# Patient Record
Sex: FEMALE | Race: OTHER | HISPANIC OR LATINO
[De-identification: names, ages, dates, MRNs, and addresses within clinical notes are randomized per-mention and may not be internally consistent; named-entity substitution may affect disease eponyms.]

---

## 2017-09-12 PROBLEM — Z00.00 ENCOUNTER FOR PREVENTIVE HEALTH EXAMINATION: Noted: 2017-09-12

## 2017-10-04 ENCOUNTER — OTHER (OUTPATIENT)
Age: 62
End: 2017-10-04

## 2017-10-04 DIAGNOSIS — M25.569 PAIN IN UNSPECIFIED KNEE: ICD-10-CM

## 2017-10-12 ENCOUNTER — APPOINTMENT (OUTPATIENT)
Dept: ORTHOPEDIC SURGERY | Facility: CLINIC | Age: 62
End: 2017-10-12
Payer: MEDICAID

## 2017-10-12 VITALS
BODY MASS INDEX: 35.34 KG/M2 | HEIGHT: 60 IN | HEART RATE: 73 BPM | DIASTOLIC BLOOD PRESSURE: 82 MMHG | WEIGHT: 180 LBS | SYSTOLIC BLOOD PRESSURE: 133 MMHG

## 2017-10-12 DIAGNOSIS — Z78.9 OTHER SPECIFIED HEALTH STATUS: ICD-10-CM

## 2017-10-12 PROCEDURE — 20610 DRAIN/INJ JOINT/BURSA W/O US: CPT | Mod: RT

## 2017-10-12 PROCEDURE — 99203 OFFICE O/P NEW LOW 30 MIN: CPT | Mod: 25

## 2017-10-12 PROCEDURE — 73564 X-RAY EXAM KNEE 4 OR MORE: CPT | Mod: RT

## 2019-09-15 ENCOUNTER — EMERGENCY (EMERGENCY)
Facility: HOSPITAL | Age: 64
LOS: 1 days | Discharge: HOME | End: 2019-09-15
Attending: EMERGENCY MEDICINE
Payer: COMMERCIAL

## 2019-09-15 VITALS
RESPIRATION RATE: 18 BRPM | OXYGEN SATURATION: 100 % | WEIGHT: 179.9 LBS | SYSTOLIC BLOOD PRESSURE: 163 MMHG | HEART RATE: 69 BPM | DIASTOLIC BLOOD PRESSURE: 79 MMHG | TEMPERATURE: 99 F

## 2019-09-15 VITALS
DIASTOLIC BLOOD PRESSURE: 72 MMHG | RESPIRATION RATE: 16 BRPM | TEMPERATURE: 98 F | OXYGEN SATURATION: 99 % | SYSTOLIC BLOOD PRESSURE: 137 MMHG | HEART RATE: 65 BPM

## 2019-09-15 LAB
ALBUMIN SERPL ELPH-MCNC: 4.6 G/DL — SIGNIFICANT CHANGE UP (ref 3.3–5.2)
ALP SERPL-CCNC: 95 U/L — SIGNIFICANT CHANGE UP (ref 40–120)
ALT FLD-CCNC: 14 U/L — SIGNIFICANT CHANGE UP
ANION GAP SERPL CALC-SCNC: 13 MMOL/L — SIGNIFICANT CHANGE UP (ref 5–17)
AST SERPL-CCNC: 16 U/L — SIGNIFICANT CHANGE UP
BILIRUB SERPL-MCNC: 0.3 MG/DL — LOW (ref 0.4–2)
BUN SERPL-MCNC: 8 MG/DL — SIGNIFICANT CHANGE UP (ref 8–20)
CALCIUM SERPL-MCNC: 9.6 MG/DL — SIGNIFICANT CHANGE UP (ref 8.6–10.2)
CHLORIDE SERPL-SCNC: 106 MMOL/L — SIGNIFICANT CHANGE UP (ref 98–107)
CO2 SERPL-SCNC: 22 MMOL/L — SIGNIFICANT CHANGE UP (ref 22–29)
CREAT SERPL-MCNC: 0.5 MG/DL — SIGNIFICANT CHANGE UP (ref 0.5–1.3)
GLUCOSE SERPL-MCNC: 115 MG/DL — SIGNIFICANT CHANGE UP (ref 70–115)
HCT VFR BLD CALC: 40.9 % — SIGNIFICANT CHANGE UP (ref 34.5–45)
HGB BLD-MCNC: 13.5 G/DL — SIGNIFICANT CHANGE UP (ref 11.5–15.5)
MAGNESIUM SERPL-MCNC: 2.3 MG/DL — SIGNIFICANT CHANGE UP (ref 1.6–2.6)
MCHC RBC-ENTMCNC: 28.8 PG — SIGNIFICANT CHANGE UP (ref 27–34)
MCHC RBC-ENTMCNC: 33 GM/DL — SIGNIFICANT CHANGE UP (ref 32–36)
MCV RBC AUTO: 87.4 FL — SIGNIFICANT CHANGE UP (ref 80–100)
NT-PROBNP SERPL-SCNC: 77 PG/ML — SIGNIFICANT CHANGE UP (ref 0–300)
PLATELET # BLD AUTO: 310 K/UL — SIGNIFICANT CHANGE UP (ref 150–400)
POTASSIUM SERPL-MCNC: 3.9 MMOL/L — SIGNIFICANT CHANGE UP (ref 3.5–5.3)
POTASSIUM SERPL-SCNC: 3.9 MMOL/L — SIGNIFICANT CHANGE UP (ref 3.5–5.3)
PROT SERPL-MCNC: 7.6 G/DL — SIGNIFICANT CHANGE UP (ref 6.6–8.7)
RBC # BLD: 4.68 M/UL — SIGNIFICANT CHANGE UP (ref 3.8–5.2)
RBC # FLD: 14 % — SIGNIFICANT CHANGE UP (ref 10.3–14.5)
SODIUM SERPL-SCNC: 141 MMOL/L — SIGNIFICANT CHANGE UP (ref 135–145)
TROPONIN T SERPL-MCNC: <0.01 NG/ML — SIGNIFICANT CHANGE UP (ref 0–0.06)
WBC # BLD: 8.33 K/UL — SIGNIFICANT CHANGE UP (ref 3.8–10.5)
WBC # FLD AUTO: 8.33 K/UL — SIGNIFICANT CHANGE UP (ref 3.8–10.5)

## 2019-09-15 PROCEDURE — T1013: CPT

## 2019-09-15 PROCEDURE — 99284 EMERGENCY DEPT VISIT MOD MDM: CPT | Mod: 25

## 2019-09-15 PROCEDURE — 83735 ASSAY OF MAGNESIUM: CPT

## 2019-09-15 PROCEDURE — 36415 COLL VENOUS BLD VENIPUNCTURE: CPT

## 2019-09-15 PROCEDURE — 71046 X-RAY EXAM CHEST 2 VIEWS: CPT | Mod: 26

## 2019-09-15 PROCEDURE — 93010 ELECTROCARDIOGRAM REPORT: CPT

## 2019-09-15 PROCEDURE — 71046 X-RAY EXAM CHEST 2 VIEWS: CPT

## 2019-09-15 PROCEDURE — 70450 CT HEAD/BRAIN W/O DYE: CPT

## 2019-09-15 PROCEDURE — 85027 COMPLETE CBC AUTOMATED: CPT

## 2019-09-15 PROCEDURE — 96375 TX/PRO/DX INJ NEW DRUG ADDON: CPT

## 2019-09-15 PROCEDURE — 99285 EMERGENCY DEPT VISIT HI MDM: CPT

## 2019-09-15 PROCEDURE — 84484 ASSAY OF TROPONIN QUANT: CPT

## 2019-09-15 PROCEDURE — 70450 CT HEAD/BRAIN W/O DYE: CPT | Mod: 26

## 2019-09-15 PROCEDURE — 96374 THER/PROPH/DIAG INJ IV PUSH: CPT

## 2019-09-15 PROCEDURE — 93005 ELECTROCARDIOGRAM TRACING: CPT

## 2019-09-15 PROCEDURE — 83880 ASSAY OF NATRIURETIC PEPTIDE: CPT

## 2019-09-15 PROCEDURE — 80053 COMPREHEN METABOLIC PANEL: CPT

## 2019-09-15 RX ORDER — KETOROLAC TROMETHAMINE 30 MG/ML
15 SYRINGE (ML) INJECTION ONCE
Refills: 0 | Status: DISCONTINUED | OUTPATIENT
Start: 2019-09-15 | End: 2019-09-15

## 2019-09-15 RX ORDER — METOCLOPRAMIDE HCL 10 MG
10 TABLET ORAL ONCE
Refills: 0 | Status: COMPLETED | OUTPATIENT
Start: 2019-09-15 | End: 2019-09-15

## 2019-09-15 RX ORDER — MECLIZINE HCL 12.5 MG
25 TABLET ORAL ONCE
Refills: 0 | Status: COMPLETED | OUTPATIENT
Start: 2019-09-15 | End: 2019-09-15

## 2019-09-15 RX ORDER — MECLIZINE HCL 12.5 MG
1 TABLET ORAL
Qty: 9 | Refills: 0
Start: 2019-09-15 | End: 2019-09-17

## 2019-09-15 RX ORDER — SODIUM CHLORIDE 9 MG/ML
1000 INJECTION INTRAMUSCULAR; INTRAVENOUS; SUBCUTANEOUS ONCE
Refills: 0 | Status: COMPLETED | OUTPATIENT
Start: 2019-09-15 | End: 2019-09-15

## 2019-09-15 RX ADMIN — Medication 15 MILLIGRAM(S): at 04:54

## 2019-09-15 RX ADMIN — Medication 25 MILLIGRAM(S): at 04:55

## 2019-09-15 RX ADMIN — Medication 10 MILLIGRAM(S): at 04:54

## 2019-09-15 RX ADMIN — SODIUM CHLORIDE 1000 MILLILITER(S): 9 INJECTION INTRAMUSCULAR; INTRAVENOUS; SUBCUTANEOUS at 04:55

## 2019-09-15 NOTE — ED PROVIDER NOTE - NS_ ATTENDINGSCRIBEDETAILS _ED_A_ED_FT
I, Nitza Rodriguez, performed the initial face to face bedside interview with this patient regarding history of present illness, review of symptoms and relevant past medical, social and family history.  I completed an independent physical examination.  The history, relevant review of systems, past medical and surgical history, medical decision making, and physical examination was documented by the scribe in my presence and I attest to the accuracy of the documentation.

## 2019-09-15 NOTE — ED PROVIDER NOTE - CLINICAL SUMMARY MEDICAL DECISION MAKING FREE TEXT BOX
Pt with vertigo, worse with movement since Friday, no focal neuro deficits, not ataxic, c/o HA and 1 ep of vomiting treat as vertigo, CT head and reassess. Pt with vertigo, worse with movement since Friday, no focal neuro deficits, not ataxic, c/o HA and 1 ep of SOB today. Will treat as vertigo, CT head and reassess.

## 2019-09-15 NOTE — ED PROVIDER NOTE - PROGRESS NOTE DETAILS
The patient was re-examined after interventions and is feeling much better.  Able to walk without difficulty, vertigo resolved. w/u negative. The patient will follow up with their primary physician this week.

## 2019-09-15 NOTE — ED PROVIDER NOTE - OBJECTIVE STATEMENT
65 y/o F pt with no significant PMHx presents to the ED c/o constant dizziness that began 2 days ago (Friday). Pt reports associated nausea and headache that began today and an episode of vomiting today. Notes that she is currently dizzy even while laying in stretcher at rest, but is worsened with movement. Also currently have headache. Denies smoking, any known allergies. No further acute complaints at this time. 65 y/o F pt with no significant PMHx presents to the ED c/o constant dizziness that began 2 days ago (Friday). Pt reports associated nausea and headache that began today and an episode of vomiting and SOB today. Notes that she is currently dizzy even while laying in stretcher at rest, but is worsened with movement. Also currently have headache. Denies smoking, any known allergies. No further acute complaints at this time. 63 y/o F pt with no significant PMHx presents to the ED c/o dizziness that began 2 days ago (Friday). Pt reports associated nausea and headache that began today and an episode of vomiting and SOB today. HA mild frontal. also with tinnitus in rt ear. Notes that she is currently dizzy even while laying in stretcher at rest, but is worsened with movement. Also currently have headache. Denies smoking, any known allergies. No further acute complaints at this time.

## 2019-09-15 NOTE — ED PROVIDER NOTE - PLAN OF CARE
1. Return to ED for worsening, progressive or any other concerning symptoms   2. Follow up with your primary care doctor in 2-3days   3. Take motrin 600mg every 6 hours as needed for pain and Take Tylenol up to 650 mg every 6 hours as needed for pain.   4. Take 25mg of meclizine three times a day for 3-5 days.   5. Follow up with neurology Dr Bianchi

## 2019-09-15 NOTE — ED PROVIDER NOTE - NS ED ROS FT
ROS: no CP/SOB. no cough. no fever. no d/c. no abd pain. no rash. no bleeding. no urinary complaints. no weakness. no vision changes. no neck/back pain. no extremity swelling/deformity. No change in mental status.'    (+) dizziness, vomiting, nausea, HA ROS: no CP. no cough. no fever. no d/c. no abd pain. no rash. no bleeding. no urinary complaints. no weakness. no vision changes. no neck/back pain. no extremity swelling/deformity. No change in mental status.'    (+) dizziness, vomiting, nausea, HA, SOB

## 2019-09-15 NOTE — ED ADULT TRIAGE NOTE - CHIEF COMPLAINT QUOTE
patient biba from home with complaints of dizziness, nausea and a headache which has become worse since yesterday, patient states that the dizziness and headache gets worse when she goes to sit up. patient states that tonight she has had difficulty breathing and then the symptoms became worse patient does not appear to be in any apparent distress at this time

## 2019-09-15 NOTE — ED PROVIDER NOTE - CHIEF COMPLAINT
She did not have any specific lab tests to evaluate for cardiac disease prior to her last physical. If she does have a heart murmur, an echocardiogram could be ordered to look at the structure of her heart and further testing would depend on if she has any symptoms of cardiac disease, chest pain, dizziness, shortness of breath.. She does have an elevated total cholesterol and I suggest she discuss potential treatment of this with Dr. Gramajo after she returns.   The patient is a 64y Female complaining of nausea.

## 2019-09-15 NOTE — ED PROVIDER NOTE - PHYSICAL EXAMINATION
Gen: NAD, AOx3  Head: NCAT  HEENT: PERRL, EOMI, oral mucosa moist, normal conjunctiva, neck supple  Lung: CTAB, no respiratory distress  CV: rrr, no murmur, Normal perfusion  Abd: soft, NTND, no CVA tenderness  MSK: No edema, no visible deformities  Neuro: CN II-XII intact, 5/5 global strength, sensation intact, no dysmetria/ataxia, positive L sided fatigue-able nystagmus    Skin: No rash   Psych: normal affect Gen: NAD, AOx3  Head: NCAT  HEENT: PERRL, EOMI, oral mucosa moist, normal conjunctiva, neck supple  Lung: CTAB, no respiratory distress  CV: rrr, no murmur, Normal perfusion  Abd: soft, NTND  MSK: No edema, no visible deformities  Neuro: CN II-XII intact, 5/5 global strength, sensation intact, no dysmetria/ataxia, positive L sided fatigue-able nystagmus    Skin: No rash   Psych: normal affect

## 2019-09-15 NOTE — ED PROVIDER NOTE - CARE PLAN
Principal Discharge DX:	Vertigo  Assessment and plan of treatment:	1. Return to ED for worsening, progressive or any other concerning symptoms   2. Follow up with your primary care doctor in 2-3days   3. Take motrin 600mg every 6 hours as needed for pain and Take Tylenol up to 650 mg every 6 hours as needed for pain.   4. Take 25mg of meclizine three times a day for 3-5 days.   5. Follow up with neurology Dr Bianchi

## 2019-09-15 NOTE — ED PROVIDER NOTE - PATIENT PORTAL LINK FT
You can access the FollowMyHealth Patient Portal offered by [facility] by registering at the following website: [email or web address]. By joining [facility]’s FollowMyHealth portal, you will also be able to view your health information using other applications (apps) compatible with our system.

## 2020-02-29 ENCOUNTER — EMERGENCY (EMERGENCY)
Facility: HOSPITAL | Age: 65
LOS: 1 days | Discharge: HOME | End: 2020-02-29
Attending: EMERGENCY MEDICINE
Payer: SELF-PAY

## 2020-02-29 VITALS
HEART RATE: 70 BPM | HEIGHT: 64 IN | TEMPERATURE: 99 F | OXYGEN SATURATION: 98 % | SYSTOLIC BLOOD PRESSURE: 181 MMHG | WEIGHT: 179.24 LBS | DIASTOLIC BLOOD PRESSURE: 80 MMHG | RESPIRATION RATE: 18 BRPM

## 2020-02-29 PROCEDURE — 96372 THER/PROPH/DIAG INJ SC/IM: CPT

## 2020-02-29 PROCEDURE — 73562 X-RAY EXAM OF KNEE 3: CPT

## 2020-02-29 PROCEDURE — 99284 EMERGENCY DEPT VISIT MOD MDM: CPT

## 2020-02-29 PROCEDURE — 99283 EMERGENCY DEPT VISIT LOW MDM: CPT | Mod: 25

## 2020-02-29 PROCEDURE — 73562 X-RAY EXAM OF KNEE 3: CPT | Mod: 26,50

## 2020-02-29 PROCEDURE — T1013: CPT

## 2020-02-29 RX ORDER — KETOROLAC TROMETHAMINE 30 MG/ML
30 SYRINGE (ML) INJECTION ONCE
Refills: 0 | Status: DISCONTINUED | OUTPATIENT
Start: 2020-02-29 | End: 2020-02-29

## 2020-02-29 RX ADMIN — Medication 30 MILLIGRAM(S): at 14:52

## 2020-02-29 NOTE — ED PROVIDER NOTE - ATTENDING CONTRIBUTION TO CARE
63 y/o knee pain s/p fall slipped on fruit skin  swelling to right knee   tenderness    xray    analgesia

## 2020-02-29 NOTE — ED PROVIDER NOTE - CLINICAL SUMMARY MEDICAL DECISION MAKING FREE TEXT BOX
64 yr old F presented to ED for knee pain s/p fall. Pt explained that she was shopping when she slipped on a fruit skin. Pt states that she grabbed on the shopping cart so she did not fall to the floor. Examination + small swelling to right knee. Slight tenderness to right shoulder and left knee. Pt treated with pain medication and X-ray + degenerative knee disease. Pt D/c with Rx sent to her Pharmacy.

## 2020-02-29 NOTE — ED ADULT TRIAGE NOTE - CHIEF COMPLAINT QUOTE
pt arrive by ambulance with c/o bilat knee pain R>L s/p [place] on a banana peel in the super market

## 2020-02-29 NOTE — ED PROVIDER NOTE - PHYSICAL EXAMINATION
Knee examination : No soft tissue swelling noted. Right /Left Knee No Ballottement signs noted. No deformity noted on examination. No anterior drawer/Posterior drawer sign noted. Negative Valgus/Varus test. Negative  Marquita test .  Tenderness on palpation of knee.

## 2020-02-29 NOTE — ED PROVIDER NOTE - OBJECTIVE STATEMENT
64 yr old F presented to ED for knee pain s/p fall. Pt explained that she was shopping when she slipped on a fruit skin. Pt states that she grabbed on the shopping cart so she did not fall to the floor and she did not hit her head. Pt states that she legs went forward and she had pain in both knees with pain sever in the right than the left. Pt admits to pain but no numbness, weakness or paraesthesia. Pt also admits to pain in her right shoulder. Pt denies any headache, neck pain or chest pain.

## 2021-05-20 ENCOUNTER — APPOINTMENT (OUTPATIENT)
Dept: ORTHOPEDIC SURGERY | Facility: CLINIC | Age: 66
End: 2021-05-20

## 2021-05-27 ENCOUNTER — APPOINTMENT (OUTPATIENT)
Dept: ORTHOPEDIC SURGERY | Facility: CLINIC | Age: 66
End: 2021-05-27
Payer: COMMERCIAL

## 2021-05-27 VITALS
DIASTOLIC BLOOD PRESSURE: 80 MMHG | WEIGHT: 200 LBS | BODY MASS INDEX: 36.8 KG/M2 | HEIGHT: 62 IN | HEART RATE: 71 BPM | SYSTOLIC BLOOD PRESSURE: 133 MMHG

## 2021-05-27 DIAGNOSIS — M17.11 UNILATERAL PRIMARY OSTEOARTHRITIS, RIGHT KNEE: ICD-10-CM

## 2021-05-27 PROCEDURE — 73564 X-RAY EXAM KNEE 4 OR MORE: CPT | Mod: RT

## 2021-05-27 PROCEDURE — 20610 DRAIN/INJ JOINT/BURSA W/O US: CPT | Mod: RT

## 2021-05-27 PROCEDURE — 99205 OFFICE O/P NEW HI 60 MIN: CPT | Mod: 25

## 2021-05-27 PROCEDURE — 99072 ADDL SUPL MATRL&STAF TM PHE: CPT

## 2021-05-27 NOTE — HISTORY OF PRESENT ILLNESS
[de-identified] : Patient is a 65-year-old female presenting for evaluation of over 1 year history of right knee pain.  Patient's pain began in February 2020 when she fell forward landing on the right knee.  She now has globalized pain most specifically laterally and medially.  Her pain is worse with all weight-bear activity including walking long distance, rising from seated position, and using stairs.  She admits to buckling and locking of the knee at times.  Her knee feels unstable when she walks.  She has tried physical therapy for over a year without improvement.  She been taking Tylenol and over-the-counter anti-inflammatories without significant improvement.  Patient had a cortisone injection to the right knee in 2017 which worked well at the time but has since worn off.

## 2021-05-27 NOTE — ADDENDUM
[FreeTextEntry1] : IMahogany assisted with documentation on 05/27/2021  acting as scribe for Dr. Diego Morton.

## 2021-05-27 NOTE — CONSULT LETTER
[Dear  ___] : Dear  [unfilled], [Consult Letter:] : I had the pleasure of evaluating your patient, [unfilled]. [Please see my note below.] : Please see my note below. [Consult Closing:] : Thank you very much for allowing me to participate in the care of this patient.  If you have any questions, please do not hesitate to contact me. [Sincerely,] : Sincerely, [FreeTextEntry2] : MARLY MARIN MD\par  [FreeTextEntry3] : Diego Morton MD\par Chief of Joint Replacement\par [facility] Knee Replacement \par [facility] [place]\par \par

## 2021-05-27 NOTE — DISCUSSION/SUMMARY
[de-identified] : The patient is a 65 year old female presenting with bone on bone arthritis of the right knee. Based upon the patients continued symptoms and failure to respond to conservative treatment I have recommended a right total knee replacement for the patient.  A discussion was had with the patient regarding a right total knee replacement. A long discussion was had with the patient as what the total joint replacement would entail. A model was used to demonstrate the operation and to discuss bearing surfaces of the implants. The hospitalization and rehabilitation were discussed.  The use of perioperative antibiotics and DVT prophylaxis were discussed. The risks, benefits and alternatives to surgical intervention were discussed at length with the patient. Specific risks discussed included: infection, wound breakdown, numbness and damage to nerves, tendon, muscle, arteries or other blood vessels. The possibility of recurrent pain, no improvement in pain and actual worsening of the pain were also mentioned in conversation with the patient. Medical complications related to the patient's general medical health including deep vein thrombosis, pulmonary embolus, heart attack, stroke, death and other complications from anesthesia were discussed as well. The patient was told that we will take steps to minimize these risks by using sterile technique, antibiotics and DVT prophylaxis when appropriate and following the patient postoperatively in the clinic setting to monitor progress. The benefits of surgery were discussed with the patient including the potential to improve the current clinical condition through operative intervention. Alternatives to surgical intervention include continued conservative management which may yield less than optimal results in this particular patient. All questions were answered to the satisfaction of the patient. Models were used as an educational tool. We did discuss implant choices and fixation, with shared decision making with the patient. We discussed that their knee replacement will be done with robotic assistance to enhance accuracy and dynamic joint balancing. \par \par Patient will have to delay surgery until after she has completely recovered from her upcoming shoulder and eye surgery. Cortisone injection was provided in office today to help manage her pain until then.

## 2021-05-27 NOTE — PROCEDURE
[de-identified] : Using sterile technique, 2cc of depomedrol 40mg/ml, 4cc of 1% plain lidocaine, and 2 cc 0.25% marcaine was drawn up into a sterile syringe.  The right knee was then sterilely prepped with chlorhexidine. Ethyl chloride spray was used to anesthesize the skin and subQ tissue.  The depomedrol/lidocaine/marcaine mixture was then injected into the knee joint in the anterolateral position.  The patient tolerated the procedure well without difficulty.  The patient was given instructions on the use of ice and anti-inflammatories post injection site soreness

## 2021-05-27 NOTE — PHYSICAL EXAM
[de-identified] : The patient appears well nourished  and in no apparent distress.  The patient is alert and oriented to person, place, and time.   Affect and mood appear normal. The head is normocephalic and atraumatic.  The eyes reveal normal sclera and extra ocular muscles are intact. The tongue is midline with no apparent lesions.  Skin shows normal turgor with no evidence of eczema or psoriasis.  No respiratory distress noted.  Sensation grossly intact.  [de-identified] : Exam of the right knee shows 10 degrees of varus which is fully correctable, 3 mm laxity of the MCL, -10 to 110 degrees of flexion measured with a goniometer. 5/5 motor strength bilaterally distally. Sensation intact distally. There is a small effusion.  [de-identified] : X-ray: 4 views of the right knee demonstrate bone on bone arthritis.

## 2021-08-25 ENCOUNTER — APPOINTMENT (OUTPATIENT)
Dept: CT IMAGING | Facility: CLINIC | Age: 66
End: 2021-08-25
Payer: MEDICARE

## 2021-08-25 ENCOUNTER — APPOINTMENT (OUTPATIENT)
Dept: CT IMAGING | Facility: CLINIC | Age: 66
End: 2021-08-25

## 2021-08-25 ENCOUNTER — OUTPATIENT (OUTPATIENT)
Dept: OUTPATIENT SERVICES | Facility: HOSPITAL | Age: 66
LOS: 1 days | End: 2021-08-25
Payer: MEDICARE

## 2021-08-25 DIAGNOSIS — M17.11 UNILATERAL PRIMARY OSTEOARTHRITIS, RIGHT KNEE: ICD-10-CM

## 2021-08-25 PROCEDURE — 73700 CT LOWER EXTREMITY W/O DYE: CPT | Mod: 26,RT

## 2021-08-25 PROCEDURE — 73700 CT LOWER EXTREMITY W/O DYE: CPT

## 2021-09-01 ENCOUNTER — APPOINTMENT (OUTPATIENT)
Dept: CT IMAGING | Facility: CLINIC | Age: 66
End: 2021-09-01

## 2021-09-02 ENCOUNTER — OUTPATIENT (OUTPATIENT)
Dept: OUTPATIENT SERVICES | Facility: HOSPITAL | Age: 66
LOS: 1 days | End: 2021-09-02
Payer: MEDICARE

## 2021-09-02 VITALS
DIASTOLIC BLOOD PRESSURE: 78 MMHG | OXYGEN SATURATION: 97 % | TEMPERATURE: 98 F | HEIGHT: 61 IN | SYSTOLIC BLOOD PRESSURE: 141 MMHG | HEART RATE: 71 BPM | WEIGHT: 198.64 LBS | RESPIRATION RATE: 20 BRPM

## 2021-09-02 DIAGNOSIS — Z98.890 OTHER SPECIFIED POSTPROCEDURAL STATES: Chronic | ICD-10-CM

## 2021-09-02 DIAGNOSIS — M17.11 UNILATERAL PRIMARY OSTEOARTHRITIS, RIGHT KNEE: ICD-10-CM

## 2021-09-02 DIAGNOSIS — Z01.818 ENCOUNTER FOR OTHER PREPROCEDURAL EXAMINATION: ICD-10-CM

## 2021-09-02 DIAGNOSIS — Z98.41 CATARACT EXTRACTION STATUS, RIGHT EYE: Chronic | ICD-10-CM

## 2021-09-02 DIAGNOSIS — Z29.9 ENCOUNTER FOR PROPHYLACTIC MEASURES, UNSPECIFIED: ICD-10-CM

## 2021-09-02 DIAGNOSIS — I10 ESSENTIAL (PRIMARY) HYPERTENSION: ICD-10-CM

## 2021-09-02 LAB
A1C WITH ESTIMATED AVERAGE GLUCOSE RESULT: 6.2 % — HIGH (ref 4–5.6)
ALBUMIN SERPL ELPH-MCNC: 4.4 G/DL — SIGNIFICANT CHANGE UP (ref 3.3–5.2)
ALP SERPL-CCNC: 93 U/L — SIGNIFICANT CHANGE UP (ref 40–120)
ALT FLD-CCNC: 14 U/L — SIGNIFICANT CHANGE UP
ANION GAP SERPL CALC-SCNC: 12 MMOL/L — SIGNIFICANT CHANGE UP (ref 5–17)
APTT BLD: 38.6 SEC — HIGH (ref 27.5–35.5)
AST SERPL-CCNC: 16 U/L — SIGNIFICANT CHANGE UP
BASOPHILS # BLD AUTO: 0.04 K/UL — SIGNIFICANT CHANGE UP (ref 0–0.2)
BASOPHILS NFR BLD AUTO: 0.7 % — SIGNIFICANT CHANGE UP (ref 0–2)
BILIRUB SERPL-MCNC: 0.2 MG/DL — LOW (ref 0.4–2)
BLD GP AB SCN SERPL QL: SIGNIFICANT CHANGE UP
BUN SERPL-MCNC: 11.8 MG/DL — SIGNIFICANT CHANGE UP (ref 8–20)
CALCIUM SERPL-MCNC: 10.1 MG/DL — SIGNIFICANT CHANGE UP (ref 8.6–10.2)
CHLORIDE SERPL-SCNC: 104 MMOL/L — SIGNIFICANT CHANGE UP (ref 98–107)
CO2 SERPL-SCNC: 23 MMOL/L — SIGNIFICANT CHANGE UP (ref 22–29)
CREAT SERPL-MCNC: 0.61 MG/DL — SIGNIFICANT CHANGE UP (ref 0.5–1.3)
EOSINOPHIL # BLD AUTO: 0.07 K/UL — SIGNIFICANT CHANGE UP (ref 0–0.5)
EOSINOPHIL NFR BLD AUTO: 1.2 % — SIGNIFICANT CHANGE UP (ref 0–6)
ESTIMATED AVERAGE GLUCOSE: 131 MG/DL — HIGH (ref 68–114)
GLUCOSE SERPL-MCNC: 136 MG/DL — HIGH (ref 70–99)
HCT VFR BLD CALC: 36.1 % — SIGNIFICANT CHANGE UP (ref 34.5–45)
HGB BLD-MCNC: 12.2 G/DL — SIGNIFICANT CHANGE UP (ref 11.5–15.5)
IMM GRANULOCYTES NFR BLD AUTO: 0.5 % — SIGNIFICANT CHANGE UP (ref 0–1.5)
INR BLD: 1.1 RATIO — SIGNIFICANT CHANGE UP (ref 0.88–1.16)
LYMPHOCYTES # BLD AUTO: 1.66 K/UL — SIGNIFICANT CHANGE UP (ref 1–3.3)
LYMPHOCYTES # BLD AUTO: 27.7 % — SIGNIFICANT CHANGE UP (ref 13–44)
MCHC RBC-ENTMCNC: 29.8 PG — SIGNIFICANT CHANGE UP (ref 27–34)
MCHC RBC-ENTMCNC: 33.8 GM/DL — SIGNIFICANT CHANGE UP (ref 32–36)
MCV RBC AUTO: 88.3 FL — SIGNIFICANT CHANGE UP (ref 80–100)
MONOCYTES # BLD AUTO: 0.35 K/UL — SIGNIFICANT CHANGE UP (ref 0–0.9)
MONOCYTES NFR BLD AUTO: 5.8 % — SIGNIFICANT CHANGE UP (ref 2–14)
MRSA PCR RESULT.: SIGNIFICANT CHANGE UP
NEUTROPHILS # BLD AUTO: 3.85 K/UL — SIGNIFICANT CHANGE UP (ref 1.8–7.4)
NEUTROPHILS NFR BLD AUTO: 64.1 % — SIGNIFICANT CHANGE UP (ref 43–77)
PLATELET # BLD AUTO: 287 K/UL — SIGNIFICANT CHANGE UP (ref 150–400)
POTASSIUM SERPL-MCNC: 4 MMOL/L — SIGNIFICANT CHANGE UP (ref 3.5–5.3)
POTASSIUM SERPL-SCNC: 4 MMOL/L — SIGNIFICANT CHANGE UP (ref 3.5–5.3)
PROT SERPL-MCNC: 7.1 G/DL — SIGNIFICANT CHANGE UP (ref 6.6–8.7)
PROTHROM AB SERPL-ACNC: 12.7 SEC — SIGNIFICANT CHANGE UP (ref 10.6–13.6)
RBC # BLD: 4.09 M/UL — SIGNIFICANT CHANGE UP (ref 3.8–5.2)
RBC # FLD: 14.1 % — SIGNIFICANT CHANGE UP (ref 10.3–14.5)
S AUREUS DNA NOSE QL NAA+PROBE: SIGNIFICANT CHANGE UP
SODIUM SERPL-SCNC: 139 MMOL/L — SIGNIFICANT CHANGE UP (ref 135–145)
WBC # BLD: 6 K/UL — SIGNIFICANT CHANGE UP (ref 3.8–10.5)
WBC # FLD AUTO: 6 K/UL — SIGNIFICANT CHANGE UP (ref 3.8–10.5)

## 2021-09-02 PROCEDURE — 93005 ELECTROCARDIOGRAM TRACING: CPT

## 2021-09-02 PROCEDURE — G0463: CPT

## 2021-09-02 PROCEDURE — 93010 ELECTROCARDIOGRAM REPORT: CPT

## 2021-09-02 RX ORDER — INFLUENZA VIRUS VACCINE 15; 15; 15; 15 UG/.5ML; UG/.5ML; UG/.5ML; UG/.5ML
0.5 SUSPENSION INTRAMUSCULAR ONCE
Refills: 0 | Status: DISCONTINUED | OUTPATIENT
Start: 2021-09-02 | End: 2021-09-16

## 2021-09-02 NOTE — H&P PST ADULT - PROBLEM SELECTOR PLAN 2
preop assessment, medical clearance pending, right TKR on 9/ /21 caprini score 8, high risk for dvt, SCD ordered, surgical team to assess for dvt prophylaxis

## 2021-09-02 NOTE — H&P PST ADULT - NSICDXPASTMEDICALHX_GEN_ALL_CORE_FT
PAST MEDICAL HISTORY:  Unilateral primary osteoarthritis, right knee      PAST MEDICAL HISTORY:  Hyperlipidemia     Hypertension     Unilateral primary osteoarthritis, right knee

## 2021-09-02 NOTE — H&P PST ADULT - HISTORY OF PRESENT ILLNESS
64 yo F PMH of HTN, HLD, DM2, presents with c/o >1 year history of right knee pain. Patient's pain began in February 2020 when she fell forward landing on the right knee. She now has globalized pain most specifically laterally and medially. Her pain is worse with all weight-bear activity including walking long distance, rising from seated position, and using stairs. She admits to buckling and locking of the knee at times. Her knee feels unstable when she walks. She has tried physical therapy for over a year without improvement. She been taking Tylenol and over-the-counter anti-inflammatories without significant improvement. Patient had cortisone injections to the right knee in 2017 and on 5/27/21 that provided some relief. X-ray done on 5/27/21 4 views of the right knee demonstrate bone on bone arthritis. Preop assessment prior to right TKR w/Dr Morton on 9/ /2021 66 yo F PMH of HTN, HLD, presents with c/o >1 year history of right knee pain. Patient's pain began in February 2020 when she fell forward landing on the right knee. She now has globalized pain most specifically laterally and medially. Her pain is worse with all weight-bear activity including walking long distance, rising from seated position, and using stairs. She admits to buckling and locking of the knee at times. Her knee feels unstable when she walks. She has tried physical therapy for over a year without improvement. She been taking Tylenol and over-the-counter anti-inflammatories without significant improvement. Patient had cortisone injections to the right knee in 2017 and on 5/27/21 that provided some relief. X-ray done on 5/27/21 4 views of the right knee demonstrate bone on bone arthritis. Preop assessment prior to right TKR w/Dr Morton on 9/20/2021    Completed COVID 19 vaccination: Pfizer x2 (8/31/21)

## 2021-09-02 NOTE — H&P PST ADULT - NSICDXPASTSURGICALHX_GEN_ALL_CORE_FT
PAST SURGICAL HISTORY:  H/O shoulder surgery      PAST SURGICAL HISTORY:  H/O colonoscopy     S/P cataract surgery, right 6/2021

## 2021-09-02 NOTE — PATIENT PROFILE ADULT - NSPROHMSYMPCOND_GEN_A_NUR
pre-op instructions reviewed, MRSA/MSSA swabbed . Speaks Urdu no class or booklet info given Covid swab required 9.17.21

## 2021-09-02 NOTE — H&P PST ADULT - NSICDXFAMILYHX_GEN_ALL_CORE_FT
FAMILY HISTORY:  Father  Still living? Unknown  FH: kidney disease, Age at diagnosis: Age Unknown

## 2021-09-02 NOTE — H&P PST ADULT - ASSESSMENT
66 yo F PMH of HTN, HLD, DM2, presents with c/o >1 year history of right knee pain. Patient's pain began in 2020 when she fell forward landing on the right knee. She now has globalized pain most specifically laterally and medially. Her pain is worse with all weight-bear activity including walking long distance, rising from seated position, and using stairs. She admits to buckling and locking of the knee at times. Her knee feels unstable when she walks. She has tried physical therapy for over a year without improvement. She been taking Tylenol and over-the-counter anti-inflammatories without significant improvement. Patient had cortisone injections to the right knee in  and on 21 that provided some relief. X-ray done on 21 4 views of the right knee demonstrate bone on bone arthritis. Preop assessment prior to right TKR w/Dr Morton on     Pt was educated on preop preparation with written and verbal instructions. Pt was informed to obtain clearance >3 days before surgery. Pt will review medications with PCP. Pt was educated on NSAIDs, multivitamins and herbals that increase the risk of bleeding and need to be stopped 7 days before procedure. Pt was educated on covid testing and covid prevention, i.e. social distancing, handwashing, mask wearing. Pt verbalized understanding of the above.     OPIOID RISK TOOL    RAYMUNDO EACH BOX THAT APPLIES AND ADD TOTALS AT THE END    FAMILY HISTORY OF SUBSTANCE ABUSE                 FEMALE         MALE                                                Alcohol                             [  ]1 pt          [  ]3pts                                               Illegal Durgs                     [  ]2 pts        [  ]3pts                                               Rx Drugs                           [  ]4 pts        [  ]4 pts    PERSONAL HISTORY OF SUBSTANCE ABUSE                                                                                          Alcohol                             [  ]3 pts       [  ]3 pts                                               Illegal Drugs                     [  ]4 pts        [  ]4 pts                                               Rx Drugs                           [  ]5 pts        [  ]5 pts    AGE BETWEEN 16-45 YEARS                                      [  ]1 pt         [  ]1 pt    HISTORY OF PREADOLESCENT   SEXUAL ABUSE                                                             [  ]3 pts        [  ]0pts    PSYCHOLOGICAL DISEASE                     ADD, OCD, Bipolar, Schizophrenia        [  ]2 pts         [  ]2 pts                      Depression                                               [  ]1 pt           [  ]1 pt           SCORING TOTAL   (add numbers and type here)              ( 0 )                                     A score of 3 or lower indicated LOW risk for future opioid abuse  A score of 4 to 7 indicated moderate risk for future opioid abuse  A score of 8 or higher indicates a high risk for opioid abuse    CAPRINI VTE 2.0 SCORE [CLOT updated 2019]    AGE RELATED RISK FACTORS                                                       MOBILITY RELATED FACTORS  [ ] Age 41-60 years                                            (1 Point)                    [ ] Bed rest                                                        (1 Point)  [x ] Age: 61-74 years                                           (2 Points)                  [ ] Plaster cast                                                   (2 Points)  [ ] Age= 75 years                                              (3 Points)                    [ ] Bed bound for more than 72 hours                 (2 Points)    DISEASE RELATED RISK FACTORS                                               GENDER SPECIFIC FACTORS  [ ] Edema in the lower extremities                       (1 Point)              [ ] Pregnancy                                                     (1 Point)  [ ] Varicose veins                                               (1 Point)                     [ ] Post-partum < 6 weeks                                   (1 Point)             [ x] BMI > 25 Kg/m2                                            (1 Point)                     [ ] Hormonal therapy  or oral contraception          (1 Point)                 [ ] Sepsis (in the previous month)                        (1 Point)               [ ] History of pregnancy complications                 (1 point)  [ ] Pneumonia or serious lung disease                                               [ ] Unexplained or recurrent                     (1 Point)           (in the previous month)                               (1 Point)  [ ] Abnormal pulmonary function test                     (1 Point)                 SURGERY RELATED RISK FACTORS  [ ] Acute myocardial infarction                              (1 Point)               [ ]  Section                                             (1 Point)  [ ] Congestive heart failure (in the previous month)  (1 Point)      [ ] Minor surgery                                                  (1 Point)   [ ] Inflammatory bowel disease                             (1 Point)               [ ] Arthroscopic surgery                                        (2 Points)  [ ] Central venous access                                      (2 Points)                [ ] General surgery lasting more than 45 minutes (2 points)  [ ] Malignancy- Present or previous                   (2 Points)                [x ] Elective arthroplasty                                         (5 points)    [ ] Stroke (in the previous month)                          (5 Points)                                                                                                                                                           HEMATOLOGY RELATED FACTORS                                                 TRAUMA RELATED RISK FACTORS  [ ] Prior episodes of VTE                                     (3 Points)                [ ] Fracture of the hip, pelvis, or leg                       (5 Points)  [ ] Positive family history for VTE                         (3 Points)             [ ] Acute spinal cord injury (in the previous month)  (5 Points)  [ ] Prothrombin 65343 A                                     (3 Points)               [ ] Paralysis  (less than 1 month)                             (5 Points)  [ ] Factor V Leiden                                             (3 Points)                  [ ] Multiple Trauma within 1 month                        (5 Points)  [ ] Lupus anticoagulants                                     (3 Points)                                                           [ ] Anticardiolipin antibodies                               (3 Points)                                                       [ ] High homocysteine in the blood                      (3 Points)                                             [ ] Other congenital or acquired thrombophilia      (3 Points)                                                [ ] Heparin induced thrombocytopenia                  (3 Points)                                     Total Score [     8    ] 66 yo F PMH of HTN, HLD, presents with c/o >1 year history of right knee pain. Patient's pain began in 2020 when she fell forward landing on the right knee. She now has globalized pain most specifically laterally and medially. Her pain is worse with all weight-bear activity including walking long distance, rising from seated position, and using stairs. She admits to buckling and locking of the knee at times. Her knee feels unstable when she walks. She has tried physical therapy for over a year without improvement. She been taking Tylenol and over-the-counter anti-inflammatories without significant improvement. Patient had cortisone injections to the right knee in  and on 21 that provided some relief. X-ray done on 21 4 views of the right knee demonstrate bone on bone arthritis. Preop assessment prior to right TKR w/Dr Morton on 2021    Pt was educated on preop preparation with written and verbal instructions. Pt was informed to obtain clearance >3 days before surgery. Pt will review medications with PCP. Pt was educated on NSAIDs, multivitamins and herbals that increase the risk of bleeding and need to be stopped 7 days before procedure. Pt was educated on covid testing and covid prevention, i.e. social distancing, handwashing, mask wearing. Pt verbalized understanding of the above.     OPIOID RISK TOOL    RAYMUNDO EACH BOX THAT APPLIES AND ADD TOTALS AT THE END    FAMILY HISTORY OF SUBSTANCE ABUSE                 FEMALE         MALE                                                Alcohol                             [  ]1 pt          [  ]3pts                                               Illegal Durgs                     [  ]2 pts        [  ]3pts                                               Rx Drugs                           [  ]4 pts        [  ]4 pts    PERSONAL HISTORY OF SUBSTANCE ABUSE                                                                                          Alcohol                             [  ]3 pts       [  ]3 pts                                               Illegal Drugs                     [  ]4 pts        [  ]4 pts                                               Rx Drugs                           [  ]5 pts        [  ]5 pts    AGE BETWEEN 16-45 YEARS                                      [  ]1 pt         [  ]1 pt    HISTORY OF PREADOLESCENT   SEXUAL ABUSE                                                             [  ]3 pts        [  ]0pts    PSYCHOLOGICAL DISEASE                     ADD, OCD, Bipolar, Schizophrenia        [  ]2 pts         [  ]2 pts                      Depression                                               [  ]1 pt           [  ]1 pt           SCORING TOTAL   (add numbers and type here)              ( 0 )                                     A score of 3 or lower indicated LOW risk for future opioid abuse  A score of 4 to 7 indicated moderate risk for future opioid abuse  A score of 8 or higher indicates a high risk for opioid abuse    CAPRINI VTE 2.0 SCORE [CLOT updated 2019]    AGE RELATED RISK FACTORS                                                       MOBILITY RELATED FACTORS  [ ] Age 41-60 years                                            (1 Point)                    [ ] Bed rest                                                        (1 Point)  [x ] Age: 61-74 years                                           (2 Points)                  [ ] Plaster cast                                                   (2 Points)  [ ] Age= 75 years                                              (3 Points)                    [ ] Bed bound for more than 72 hours                 (2 Points)    DISEASE RELATED RISK FACTORS                                               GENDER SPECIFIC FACTORS  [ ] Edema in the lower extremities                       (1 Point)              [ ] Pregnancy                                                     (1 Point)  [ ] Varicose veins                                               (1 Point)                     [ ] Post-partum < 6 weeks                                   (1 Point)             [ x] BMI > 25 Kg/m2                                            (1 Point)                     [ ] Hormonal therapy  or oral contraception          (1 Point)                 [ ] Sepsis (in the previous month)                        (1 Point)               [ ] History of pregnancy complications                 (1 point)  [ ] Pneumonia or serious lung disease                                               [ ] Unexplained or recurrent                     (1 Point)           (in the previous month)                               (1 Point)  [ ] Abnormal pulmonary function test                     (1 Point)                 SURGERY RELATED RISK FACTORS  [ ] Acute myocardial infarction                              (1 Point)               [ ]  Section                                             (1 Point)  [ ] Congestive heart failure (in the previous month)  (1 Point)      [ ] Minor surgery                                                  (1 Point)   [ ] Inflammatory bowel disease                             (1 Point)               [ ] Arthroscopic surgery                                        (2 Points)  [ ] Central venous access                                      (2 Points)                [ ] General surgery lasting more than 45 minutes (2 points)  [ ] Malignancy- Present or previous                   (2 Points)                [x ] Elective arthroplasty                                         (5 points)    [ ] Stroke (in the previous month)                          (5 Points)                                                                                                                                                           HEMATOLOGY RELATED FACTORS                                                 TRAUMA RELATED RISK FACTORS  [ ] Prior episodes of VTE                                     (3 Points)                [ ] Fracture of the hip, pelvis, or leg                       (5 Points)  [ ] Positive family history for VTE                         (3 Points)             [ ] Acute spinal cord injury (in the previous month)  (5 Points)  [ ] Prothrombin 23746 A                                     (3 Points)               [ ] Paralysis  (less than 1 month)                             (5 Points)  [ ] Factor V Leiden                                             (3 Points)                  [ ] Multiple Trauma within 1 month                        (5 Points)  [ ] Lupus anticoagulants                                     (3 Points)                                                           [ ] Anticardiolipin antibodies                               (3 Points)                                                       [ ] High homocysteine in the blood                      (3 Points)                                             [ ] Other congenital or acquired thrombophilia      (3 Points)                                                [ ] Heparin induced thrombocytopenia                  (3 Points)                                     Total Score [     8    ]

## 2021-09-02 NOTE — H&P PST ADULT - PROBLEM SELECTOR PLAN 1
caprini score 8, high risk for dvt, SCD ordered, surgical team to assess for dvt prophylaxis preop assessment, follow with PCP, medical clearance pending

## 2021-09-09 PROBLEM — E78.5 HYPERLIPIDEMIA, UNSPECIFIED: Chronic | Status: ACTIVE | Noted: 2021-09-02

## 2021-09-09 PROBLEM — I10 ESSENTIAL (PRIMARY) HYPERTENSION: Chronic | Status: ACTIVE | Noted: 2021-09-02

## 2021-09-09 PROBLEM — M17.11 UNILATERAL PRIMARY OSTEOARTHRITIS, RIGHT KNEE: Chronic | Status: ACTIVE | Noted: 2021-09-02

## 2021-09-09 LAB — APTT BLD: 39 SEC

## 2021-09-10 ENCOUNTER — OUTPATIENT (OUTPATIENT)
Dept: OUTPATIENT SERVICES | Facility: HOSPITAL | Age: 66
LOS: 1 days | Discharge: HOME | End: 2021-09-10

## 2021-09-10 DIAGNOSIS — Z98.41 CATARACT EXTRACTION STATUS, RIGHT EYE: Chronic | ICD-10-CM

## 2021-09-10 DIAGNOSIS — D64.9 ANEMIA, UNSPECIFIED: ICD-10-CM

## 2021-09-10 DIAGNOSIS — Z98.890 OTHER SPECIFIED POSTPROCEDURAL STATES: Chronic | ICD-10-CM

## 2021-09-13 ENCOUNTER — RESULT REVIEW (OUTPATIENT)
Age: 66
End: 2021-09-13

## 2021-09-13 ENCOUNTER — LABORATORY RESULT (OUTPATIENT)
Age: 66
End: 2021-09-13

## 2021-09-13 ENCOUNTER — APPOINTMENT (OUTPATIENT)
Dept: HEMATOLOGY ONCOLOGY | Facility: CLINIC | Age: 66
End: 2021-09-13
Payer: MEDICARE

## 2021-09-13 VITALS
HEIGHT: 62 IN | DIASTOLIC BLOOD PRESSURE: 80 MMHG | HEART RATE: 74 BPM | OXYGEN SATURATION: 96 % | SYSTOLIC BLOOD PRESSURE: 124 MMHG | WEIGHT: 202.01 LBS | BODY MASS INDEX: 37.17 KG/M2

## 2021-09-13 DIAGNOSIS — R79.1 ABNORMAL COAGULATION PROFILE: ICD-10-CM

## 2021-09-13 LAB
BASOPHILS # BLD AUTO: 0.1 K/UL — SIGNIFICANT CHANGE UP (ref 0–0.2)
BASOPHILS NFR BLD AUTO: 0.9 % — SIGNIFICANT CHANGE UP (ref 0–2)
EOSINOPHIL # BLD AUTO: 0.1 K/UL — SIGNIFICANT CHANGE UP (ref 0–0.5)
EOSINOPHIL NFR BLD AUTO: 1.7 % — SIGNIFICANT CHANGE UP (ref 0–6)
HCT VFR BLD CALC: 39.7 % — SIGNIFICANT CHANGE UP (ref 34.5–45)
HGB BLD-MCNC: 12.4 G/DL — SIGNIFICANT CHANGE UP (ref 11.5–15.5)
LYMPHOCYTES # BLD AUTO: 2 K/UL — SIGNIFICANT CHANGE UP (ref 1–3.3)
LYMPHOCYTES # BLD AUTO: 32.8 % — SIGNIFICANT CHANGE UP (ref 13–44)
MCHC RBC-ENTMCNC: 29.1 PG — SIGNIFICANT CHANGE UP (ref 27–34)
MCHC RBC-ENTMCNC: 31.2 G/DL — LOW (ref 32–36)
MCV RBC AUTO: 93.2 FL — SIGNIFICANT CHANGE UP (ref 80–100)
MONOCYTES # BLD AUTO: 0.3 K/UL — SIGNIFICANT CHANGE UP (ref 0–0.9)
MONOCYTES NFR BLD AUTO: 5.1 % — SIGNIFICANT CHANGE UP (ref 2–14)
NEUTROPHILS # BLD AUTO: 3.6 K/UL — SIGNIFICANT CHANGE UP (ref 1.8–7.4)
NEUTROPHILS NFR BLD AUTO: 59.5 % — SIGNIFICANT CHANGE UP (ref 43–77)
PLATELET # BLD AUTO: 287 K/UL — SIGNIFICANT CHANGE UP (ref 150–400)
RBC # BLD: 4.26 M/UL — SIGNIFICANT CHANGE UP (ref 3.8–5.2)
RBC # FLD: 13.1 % — SIGNIFICANT CHANGE UP (ref 10.3–14.5)
WBC # BLD: 6.1 K/UL — SIGNIFICANT CHANGE UP (ref 3.8–10.5)
WBC # FLD AUTO: 6.1 K/UL — SIGNIFICANT CHANGE UP (ref 3.8–10.5)

## 2021-09-13 PROCEDURE — 99203 OFFICE O/P NEW LOW 30 MIN: CPT

## 2021-09-13 RX ORDER — LOSARTAN POTASSIUM 50 MG/1
50 TABLET, FILM COATED ORAL
Qty: 30 | Refills: 0 | Status: ACTIVE | COMMUNITY
Start: 2021-08-17

## 2021-09-13 RX ORDER — CYCLOBENZAPRINE HYDROCHLORIDE 10 MG/1
10 TABLET, FILM COATED ORAL
Qty: 30 | Refills: 0 | Status: ACTIVE | COMMUNITY
Start: 2021-09-07

## 2021-09-13 RX ORDER — MELOXICAM 7.5 MG/1
7.5 TABLET ORAL
Qty: 30 | Refills: 0 | Status: ACTIVE | COMMUNITY
Start: 2021-09-07

## 2021-09-13 RX ORDER — OMEPRAZOLE 40 MG/1
40 CAPSULE, DELAYED RELEASE ORAL
Qty: 30 | Refills: 0 | Status: ACTIVE | COMMUNITY
Start: 2021-09-10

## 2021-09-13 RX ORDER — PREDNISOLONE ACETATE 10 MG/ML
1 SUSPENSION/ DROPS OPHTHALMIC
Qty: 5 | Refills: 0 | Status: ACTIVE | COMMUNITY
Start: 2021-08-20

## 2021-09-13 RX ORDER — HYDROCHLOROTHIAZIDE 12.5 MG/1
12.5 CAPSULE ORAL
Qty: 30 | Refills: 0 | Status: ACTIVE | COMMUNITY
Start: 2021-08-17

## 2021-09-14 LAB
APTT 2H P 1:4 NP PPP: 37 SEC
APTT 2H P INC PPP: 37.7 SEC
APTT IMM NP/PRE NP PPP: 35.1 SEC
APTT INV RATIO PPP: 40.3 SEC
NPP NORMAL POOLED PLASMA: 33 SECS

## 2021-09-16 LAB
B2 GLYCOPROT1 IGG SER-ACNC: <5 SGU
B2 GLYCOPROT1 IGM SER-ACNC: <5 SMU
CARDIOLIPIN AB SER IA-ACNC: NEGATIVE
CARDIOLIPIN IGM SER-MCNC: 15.5 MPL
CARDIOLIPIN IGM SER-MCNC: <5 GPL
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
## 2021-09-16 NOTE — PHYSICAL EXAM
[Normal] : affect appropriate [Restricted in physically strenuous activity but ambulatory and able to carry out work of a light or sedentary nature] : Status 1- Restricted in physically strenuous activity but ambulatory and able to carry out work of a light or sedentary nature, e.g., light house work, office work [Obese] : obese [de-identified] : + Rt knee pain to palpation

## 2021-09-20 ENCOUNTER — INPATIENT (INPATIENT)
Facility: HOSPITAL | Age: 66
LOS: 1 days | Discharge: HOME | DRG: 470 | End: 2021-09-22
Attending: ORTHOPAEDIC SURGERY | Admitting: ORTHOPAEDIC SURGERY
Payer: MEDICARE

## 2021-09-20 ENCOUNTER — TRANSCRIPTION ENCOUNTER (OUTPATIENT)
Age: 66
End: 2021-09-20

## 2021-09-20 ENCOUNTER — APPOINTMENT (OUTPATIENT)
Dept: ORTHOPEDIC SURGERY | Facility: HOSPITAL | Age: 66
End: 2021-09-20

## 2021-09-20 VITALS
OXYGEN SATURATION: 99 % | HEIGHT: 64 IN | WEIGHT: 205.91 LBS | TEMPERATURE: 98 F | RESPIRATION RATE: 16 BRPM | HEART RATE: 67 BPM | SYSTOLIC BLOOD PRESSURE: 152 MMHG | DIASTOLIC BLOOD PRESSURE: 77 MMHG

## 2021-09-20 DIAGNOSIS — M17.11 UNILATERAL PRIMARY OSTEOARTHRITIS, RIGHT KNEE: ICD-10-CM

## 2021-09-20 DIAGNOSIS — I10 ESSENTIAL (PRIMARY) HYPERTENSION: ICD-10-CM

## 2021-09-20 DIAGNOSIS — Z98.41 CATARACT EXTRACTION STATUS, RIGHT EYE: Chronic | ICD-10-CM

## 2021-09-20 DIAGNOSIS — Z87.898 PERSONAL HISTORY OF OTHER SPECIFIED CONDITIONS: ICD-10-CM

## 2021-09-20 DIAGNOSIS — E78.5 HYPERLIPIDEMIA, UNSPECIFIED: ICD-10-CM

## 2021-09-20 DIAGNOSIS — Z98.890 OTHER SPECIFIED POSTPROCEDURAL STATES: Chronic | ICD-10-CM

## 2021-09-20 LAB
ABO RH CONFIRMATION: SIGNIFICANT CHANGE UP
GLUCOSE BLDC GLUCOMTR-MCNC: 120 MG/DL — HIGH (ref 70–99)
GLUCOSE BLDC GLUCOMTR-MCNC: 124 MG/DL — HIGH (ref 70–99)
GLUCOSE BLDC GLUCOMTR-MCNC: 98 MG/DL — SIGNIFICANT CHANGE UP (ref 70–99)
SARS-COV-2 RNA SPEC QL NAA+PROBE: SIGNIFICANT CHANGE UP

## 2021-09-20 PROCEDURE — 0055T BONE SRGRY CMPTR CT/MRI IMAG: CPT | Mod: RT

## 2021-09-20 PROCEDURE — 73560 X-RAY EXAM OF KNEE 1 OR 2: CPT | Mod: 26,RT

## 2021-09-20 PROCEDURE — 27447 TOTAL KNEE ARTHROPLASTY: CPT | Mod: RT

## 2021-09-20 PROCEDURE — 0055T BONE SRGRY CMPTR CT/MRI IMAG: CPT | Mod: AS,RT

## 2021-09-20 PROCEDURE — 27447 TOTAL KNEE ARTHROPLASTY: CPT | Mod: AS,RT

## 2021-09-20 PROCEDURE — 99222 1ST HOSP IP/OBS MODERATE 55: CPT

## 2021-09-20 RX ORDER — OXYCODONE HYDROCHLORIDE 5 MG/1
5 TABLET ORAL
Refills: 0 | Status: DISCONTINUED | OUTPATIENT
Start: 2021-09-20 | End: 2021-09-22

## 2021-09-20 RX ORDER — HYDROCHLOROTHIAZIDE 25 MG
12.5 TABLET ORAL DAILY
Refills: 0 | Status: DISCONTINUED | OUTPATIENT
Start: 2021-09-22 | End: 2021-09-22

## 2021-09-20 RX ORDER — TRANEXAMIC ACID 100 MG/ML
1000 INJECTION, SOLUTION INTRAVENOUS ONCE
Refills: 0 | Status: DISCONTINUED | OUTPATIENT
Start: 2021-09-20 | End: 2021-09-20

## 2021-09-20 RX ORDER — CEFAZOLIN SODIUM 1 G
2000 VIAL (EA) INJECTION ONCE
Refills: 0 | Status: DISCONTINUED | OUTPATIENT
Start: 2021-09-20 | End: 2021-09-20

## 2021-09-20 RX ORDER — CELECOXIB 200 MG/1
400 CAPSULE ORAL ONCE
Refills: 0 | Status: COMPLETED | OUTPATIENT
Start: 2021-09-20 | End: 2021-09-20

## 2021-09-20 RX ORDER — HYDROMORPHONE HYDROCHLORIDE 2 MG/ML
4 INJECTION INTRAMUSCULAR; INTRAVENOUS; SUBCUTANEOUS
Refills: 0 | Status: DISCONTINUED | OUTPATIENT
Start: 2021-09-20 | End: 2021-09-22

## 2021-09-20 RX ORDER — SODIUM CHLORIDE 9 MG/ML
1000 INJECTION INTRAMUSCULAR; INTRAVENOUS; SUBCUTANEOUS
Refills: 0 | Status: DISCONTINUED | OUTPATIENT
Start: 2021-09-20 | End: 2021-09-22

## 2021-09-20 RX ORDER — CELECOXIB 200 MG/1
200 CAPSULE ORAL EVERY 12 HOURS
Refills: 0 | Status: DISCONTINUED | OUTPATIENT
Start: 2021-09-21 | End: 2021-09-22

## 2021-09-20 RX ORDER — LOSARTAN POTASSIUM 100 MG/1
50 TABLET, FILM COATED ORAL DAILY
Refills: 0 | Status: DISCONTINUED | OUTPATIENT
Start: 2021-09-22 | End: 2021-09-22

## 2021-09-20 RX ORDER — CEFAZOLIN SODIUM 1 G
2000 VIAL (EA) INJECTION
Refills: 0 | Status: COMPLETED | OUTPATIENT
Start: 2021-09-20 | End: 2021-09-21

## 2021-09-20 RX ORDER — SENNA PLUS 8.6 MG/1
2 TABLET ORAL AT BEDTIME
Refills: 0 | Status: DISCONTINUED | OUTPATIENT
Start: 2021-09-20 | End: 2021-09-22

## 2021-09-20 RX ORDER — ACETAMINOPHEN 500 MG
975 TABLET ORAL ONCE
Refills: 0 | Status: DISCONTINUED | OUTPATIENT
Start: 2021-09-20 | End: 2021-09-20

## 2021-09-20 RX ORDER — KETOROLAC TROMETHAMINE 30 MG/ML
15 SYRINGE (ML) INJECTION EVERY 6 HOURS
Refills: 0 | Status: DISCONTINUED | OUTPATIENT
Start: 2021-09-20 | End: 2021-09-21

## 2021-09-20 RX ORDER — ONDANSETRON 8 MG/1
4 TABLET, FILM COATED ORAL ONCE
Refills: 0 | Status: DISCONTINUED | OUTPATIENT
Start: 2021-09-20 | End: 2021-09-20

## 2021-09-20 RX ORDER — ACETAMINOPHEN 500 MG
1000 TABLET ORAL
Refills: 0 | Status: COMPLETED | OUTPATIENT
Start: 2021-09-20 | End: 2021-09-20

## 2021-09-20 RX ORDER — SODIUM CHLORIDE 9 MG/ML
1000 INJECTION, SOLUTION INTRAVENOUS
Refills: 0 | Status: DISCONTINUED | OUTPATIENT
Start: 2021-09-20 | End: 2021-09-20

## 2021-09-20 RX ORDER — MAGNESIUM HYDROXIDE 400 MG/1
30 TABLET, CHEWABLE ORAL DAILY
Refills: 0 | Status: DISCONTINUED | OUTPATIENT
Start: 2021-09-20 | End: 2021-09-22

## 2021-09-20 RX ORDER — ACETAMINOPHEN 500 MG
975 TABLET ORAL ONCE
Refills: 0 | Status: COMPLETED | OUTPATIENT
Start: 2021-09-20 | End: 2021-09-20

## 2021-09-20 RX ORDER — HYDROMORPHONE HYDROCHLORIDE 2 MG/ML
0.5 INJECTION INTRAMUSCULAR; INTRAVENOUS; SUBCUTANEOUS ONCE
Refills: 0 | Status: DISCONTINUED | OUTPATIENT
Start: 2021-09-20 | End: 2021-09-22

## 2021-09-20 RX ORDER — OXYCODONE HYDROCHLORIDE 5 MG/1
10 TABLET ORAL
Refills: 0 | Status: DISCONTINUED | OUTPATIENT
Start: 2021-09-20 | End: 2021-09-22

## 2021-09-20 RX ORDER — SODIUM CHLORIDE 9 MG/ML
500 INJECTION INTRAMUSCULAR; INTRAVENOUS; SUBCUTANEOUS ONCE
Refills: 0 | Status: COMPLETED | OUTPATIENT
Start: 2021-09-20 | End: 2021-09-20

## 2021-09-20 RX ORDER — TAMSULOSIN HYDROCHLORIDE 0.4 MG/1
0.4 CAPSULE ORAL AT BEDTIME
Refills: 0 | Status: DISCONTINUED | OUTPATIENT
Start: 2021-09-20 | End: 2021-09-22

## 2021-09-20 RX ORDER — ONDANSETRON 8 MG/1
4 TABLET, FILM COATED ORAL EVERY 6 HOURS
Refills: 0 | Status: DISCONTINUED | OUTPATIENT
Start: 2021-09-20 | End: 2021-09-22

## 2021-09-20 RX ORDER — TAMSULOSIN HYDROCHLORIDE 0.4 MG/1
1 CAPSULE ORAL
Qty: 0 | Refills: 0 | DISCHARGE

## 2021-09-20 RX ORDER — ACETAMINOPHEN 500 MG
975 TABLET ORAL EVERY 8 HOURS
Refills: 0 | Status: DISCONTINUED | OUTPATIENT
Start: 2021-09-21 | End: 2021-09-22

## 2021-09-20 RX ORDER — FENTANYL CITRATE 50 UG/ML
50 INJECTION INTRAVENOUS
Refills: 0 | Status: DISCONTINUED | OUTPATIENT
Start: 2021-09-20 | End: 2021-09-20

## 2021-09-20 RX ORDER — POLYETHYLENE GLYCOL 3350 17 G/17G
17 POWDER, FOR SOLUTION ORAL AT BEDTIME
Refills: 0 | Status: DISCONTINUED | OUTPATIENT
Start: 2021-09-20 | End: 2021-09-22

## 2021-09-20 RX ORDER — LOSARTAN POTASSIUM 100 MG/1
1 TABLET, FILM COATED ORAL
Qty: 0 | Refills: 0 | DISCHARGE

## 2021-09-20 RX ORDER — SODIUM CHLORIDE 9 MG/ML
3 INJECTION INTRAMUSCULAR; INTRAVENOUS; SUBCUTANEOUS EVERY 8 HOURS
Refills: 0 | Status: DISCONTINUED | OUTPATIENT
Start: 2021-09-20 | End: 2021-09-20

## 2021-09-20 RX ORDER — ENOXAPARIN SODIUM 100 MG/ML
30 INJECTION SUBCUTANEOUS
Refills: 0 | Status: DISCONTINUED | OUTPATIENT
Start: 2021-09-21 | End: 2021-09-22

## 2021-09-20 RX ORDER — APREPITANT 80 MG/1
40 CAPSULE ORAL ONCE
Refills: 0 | Status: COMPLETED | OUTPATIENT
Start: 2021-09-20 | End: 2021-09-20

## 2021-09-20 RX ORDER — PANTOPRAZOLE SODIUM 20 MG/1
40 TABLET, DELAYED RELEASE ORAL
Refills: 0 | Status: DISCONTINUED | OUTPATIENT
Start: 2021-09-20 | End: 2021-09-22

## 2021-09-20 RX ADMIN — SODIUM CHLORIDE 100 MILLILITER(S): 9 INJECTION INTRAMUSCULAR; INTRAVENOUS; SUBCUTANEOUS at 21:35

## 2021-09-20 RX ADMIN — Medication 15 MILLIGRAM(S): at 21:34

## 2021-09-20 RX ADMIN — Medication 15 MILLIGRAM(S): at 21:47

## 2021-09-20 RX ADMIN — SODIUM CHLORIDE 500 MILLILITER(S): 9 INJECTION INTRAMUSCULAR; INTRAVENOUS; SUBCUTANEOUS at 12:55

## 2021-09-20 RX ADMIN — CELECOXIB 400 MILLIGRAM(S): 200 CAPSULE ORAL at 07:29

## 2021-09-20 RX ADMIN — Medication 400 MILLIGRAM(S): at 21:32

## 2021-09-20 RX ADMIN — Medication 100 MILLIGRAM(S): at 16:10

## 2021-09-20 RX ADMIN — Medication 15 MILLIGRAM(S): at 15:17

## 2021-09-20 RX ADMIN — OXYCODONE HYDROCHLORIDE 10 MILLIGRAM(S): 5 TABLET ORAL at 15:17

## 2021-09-20 RX ADMIN — Medication 1000 MILLIGRAM(S): at 21:47

## 2021-09-20 RX ADMIN — TAMSULOSIN HYDROCHLORIDE 0.4 MILLIGRAM(S): 0.4 CAPSULE ORAL at 21:35

## 2021-09-20 RX ADMIN — APREPITANT 40 MILLIGRAM(S): 80 CAPSULE ORAL at 07:29

## 2021-09-20 RX ADMIN — Medication 975 MILLIGRAM(S): at 07:29

## 2021-09-20 NOTE — CONSULT NOTE ADULT - PROBLEM SELECTOR RECOMMENDATION 9
s/p R TKA ,  PT/OT/pain mgmt  DVT prophylaxis- as per ortho  Abx as per SCIP  Incentive spirometry  Prophylaxis of opioid  induced constipation.

## 2021-09-20 NOTE — DISCHARGE NOTE NURSING/CASE MANAGEMENT/SOCIAL WORK - PATIENT PORTAL LINK FT
You can access the FollowMyHealth Patient Portal offered by [facility] by registering at the following website: [email or web address]. By joining [employer]’s FollowMyHealth portal, you will also be able to view your health information using other applications (apps) compatible with our system.

## 2021-09-20 NOTE — DISCHARGE NOTE PROVIDER - NSDCMRMEDTOKEN_GEN_ALL_CORE_FT
hydroCHLOROthiazide 12.5 mg oral capsule: 1 cap(s) orally once a day  losartan 50 mg oral tablet: 1 tab(s) orally once a day  tamsulosin 0.4 mg oral capsule: 1 cap(s) orally once a day   aspirin 325 mg oral tablet: 1 tab(s) orally 2 times a day (before meals)   celecoxib 200 mg oral capsule: 1 cap(s) orally every 12 hours  hydroCHLOROthiazide 12.5 mg oral capsule: 1 cap(s) orally once a day  losartan 50 mg oral tablet: 1 tab(s) orally once a day  Lovenox 30 mg/0.3 mL injectable solution: 30 milligram(s) subcutaneously every 12 hours   omeprazole 40 mg oral delayed release capsule: 1 cap(s) orally once a day   oxyCODONE 5 mg oral tablet: 1 tab(s) orally every 4 hours while awake, As Needed  Pain MDD:6  Senna S 50 mg-8.6 mg oral tablet: 2 tab(s) orally once a day (at bedtime)   tamsulosin 0.4 mg oral capsule: 1 cap(s) orally once a day

## 2021-09-20 NOTE — CONSULT NOTE ADULT - PROBLEM SELECTOR RECOMMENDATION 5
- on Flomax - continue - observe for postop   urinary retention     Thank you for the courtesy of this   consult , will follow .

## 2021-09-20 NOTE — DISCHARGE NOTE PROVIDER - CARE PROVIDER_API CALL
Diego Morton)  Orthopaedic Surgery  [address], [address]  [place] [address]  Phone: [phone]  Fax: [phone]  Follow Up Time:

## 2021-09-20 NOTE — DISCHARGE NOTE PROVIDER - CARE PROVIDERS DIRECT ADDRESSES
[email or web address]

## 2021-09-20 NOTE — DISCHARGE NOTE PROVIDER - NSDCCPCAREPLAN_GEN_ALL_CORE_FT
PRINCIPAL DISCHARGE DIAGNOSIS  Diagnosis: Unilateral primary osteoarthritis, right knee  Assessment and Plan of Treatment:

## 2021-09-20 NOTE — DISCHARGE NOTE PROVIDER - HOSPITAL COURSE
The patient underwent a RIGHT TOTAL KNEE REPLACEMENT on 9/20/21. The patient received antibiotics consistent with SCIP guidelines. The patient underwent the procedure and had no intra-operative complications. Post-operatively, the patient was seen by medicine and PT. The patient received LOVENOX for DVTP. The patient received pain medications per orthopedic pain management pathway and the pain was appropriately controlled. The patient did not have any post-operative medical complications. The patient was discharged in stable condition.

## 2021-09-20 NOTE — BRIEF OPERATIVE NOTE - NSICDXBRIEFPOSTOP_GEN_ALL_CORE_FT
POST-OP DIAGNOSIS:  Unilateral primary osteoarthritis, right knee 20-Sep-2021 12:11:08  Griselda Page

## 2021-09-20 NOTE — DISCHARGE NOTE NURSING/CASE MANAGEMENT/SOCIAL WORK - NSCORESITESY/N_GEN_A_CORE_RD
No 59M PMH HTN, obesity, cervical radiculopathy presents with multiple complaints, including chest pain and epigastric pain. Admitted for provocative testing to eval for CAD given elevated HEART score.

## 2021-09-20 NOTE — BRIEF OPERATIVE NOTE - NSICDXBRIEFPREOP_GEN_ALL_CORE_FT
PRE-OP DIAGNOSIS:  Unilateral primary osteoarthritis, right knee 20-Sep-2021 12:11:13  Griselda Page

## 2021-09-20 NOTE — DISCHARGE NOTE PROVIDER - NSDCFUSCHEDAPPT_GEN_ALL_CORE_FT
ALEJANDRA VIRAMONTES ; 10/14/2021 ; [facility] [address]JAN[address] ; 11/04/2021 ; [facility] [address]

## 2021-09-21 LAB
ANION GAP SERPL CALC-SCNC: 11 MMOL/L — SIGNIFICANT CHANGE UP (ref 5–17)
BUN SERPL-MCNC: 6.4 MG/DL — LOW (ref 8–20)
CALCIUM SERPL-MCNC: 8.7 MG/DL — SIGNIFICANT CHANGE UP (ref 8.6–10.2)
CHLORIDE SERPL-SCNC: 105 MMOL/L — SIGNIFICANT CHANGE UP (ref 98–107)
CO2 SERPL-SCNC: 22 MMOL/L — SIGNIFICANT CHANGE UP (ref 22–29)
COVID-19 SPIKE DOMAIN AB INTERP: POSITIVE
COVID-19 SPIKE DOMAIN ANTIBODY RESULT: >250 U/ML — HIGH
CREAT SERPL-MCNC: 0.46 MG/DL — LOW (ref 0.5–1.3)
GLUCOSE SERPL-MCNC: 116 MG/DL — HIGH (ref 70–99)
HCT VFR BLD CALC: 34.1 % — LOW (ref 34.5–45)
HGB BLD-MCNC: 10.9 G/DL — LOW (ref 11.5–15.5)
MCHC RBC-ENTMCNC: 29.4 PG — SIGNIFICANT CHANGE UP (ref 27–34)
MCHC RBC-ENTMCNC: 32 GM/DL — SIGNIFICANT CHANGE UP (ref 32–36)
MCV RBC AUTO: 91.9 FL — SIGNIFICANT CHANGE UP (ref 80–100)
PLATELET # BLD AUTO: 248 K/UL — SIGNIFICANT CHANGE UP (ref 150–400)
POTASSIUM SERPL-MCNC: 3.6 MMOL/L — SIGNIFICANT CHANGE UP (ref 3.5–5.3)
POTASSIUM SERPL-SCNC: 3.6 MMOL/L — SIGNIFICANT CHANGE UP (ref 3.5–5.3)
RBC # BLD: 3.71 M/UL — LOW (ref 3.8–5.2)
RBC # FLD: 14.1 % — SIGNIFICANT CHANGE UP (ref 10.3–14.5)
SARS-COV-2 IGG+IGM SERPL QL IA: >250 U/ML — HIGH
SARS-COV-2 IGG+IGM SERPL QL IA: POSITIVE
SODIUM SERPL-SCNC: 138 MMOL/L — SIGNIFICANT CHANGE UP (ref 135–145)
WBC # BLD: 8.93 K/UL — SIGNIFICANT CHANGE UP (ref 3.8–10.5)
WBC # FLD AUTO: 8.93 K/UL — SIGNIFICANT CHANGE UP (ref 3.8–10.5)

## 2021-09-21 PROCEDURE — 99232 SBSQ HOSP IP/OBS MODERATE 35: CPT

## 2021-09-21 RX ORDER — OXYCODONE HYDROCHLORIDE 5 MG/1
1 TABLET ORAL
Qty: 40 | Refills: 0
Start: 2021-09-21

## 2021-09-21 RX ORDER — CELECOXIB 200 MG/1
1 CAPSULE ORAL
Qty: 42 | Refills: 0
Start: 2021-09-21

## 2021-09-21 RX ORDER — OMEPRAZOLE 10 MG/1
1 CAPSULE, DELAYED RELEASE ORAL
Qty: 30 | Refills: 2
Start: 2021-09-21 | End: 2021-12-19

## 2021-09-21 RX ORDER — ENOXAPARIN SODIUM 100 MG/ML
30 INJECTION SUBCUTANEOUS
Qty: 26 | Refills: 0
Start: 2021-09-21 | End: 2021-10-03

## 2021-09-21 RX ORDER — SENNOSIDES/DOCUSATE SODIUM 8.6MG-50MG
2 TABLET ORAL
Qty: 30 | Refills: 0
Start: 2021-09-21

## 2021-09-21 RX ADMIN — Medication 975 MILLIGRAM(S): at 15:00

## 2021-09-21 RX ADMIN — Medication 15 MILLIGRAM(S): at 14:32

## 2021-09-21 RX ADMIN — CELECOXIB 200 MILLIGRAM(S): 200 CAPSULE ORAL at 17:29

## 2021-09-21 RX ADMIN — OXYCODONE HYDROCHLORIDE 10 MILLIGRAM(S): 5 TABLET ORAL at 20:50

## 2021-09-21 RX ADMIN — Medication 975 MILLIGRAM(S): at 22:32

## 2021-09-21 RX ADMIN — Medication 975 MILLIGRAM(S): at 05:07

## 2021-09-21 RX ADMIN — Medication 975 MILLIGRAM(S): at 23:32

## 2021-09-21 RX ADMIN — Medication 100 MILLIGRAM(S): at 00:20

## 2021-09-21 RX ADMIN — ENOXAPARIN SODIUM 30 MILLIGRAM(S): 100 INJECTION SUBCUTANEOUS at 17:29

## 2021-09-21 RX ADMIN — OXYCODONE HYDROCHLORIDE 10 MILLIGRAM(S): 5 TABLET ORAL at 21:50

## 2021-09-21 RX ADMIN — ONDANSETRON 4 MILLIGRAM(S): 8 TABLET, FILM COATED ORAL at 08:42

## 2021-09-21 RX ADMIN — Medication 975 MILLIGRAM(S): at 06:07

## 2021-09-21 RX ADMIN — PANTOPRAZOLE SODIUM 40 MILLIGRAM(S): 20 TABLET, DELAYED RELEASE ORAL at 05:07

## 2021-09-21 RX ADMIN — CELECOXIB 200 MILLIGRAM(S): 200 CAPSULE ORAL at 18:00

## 2021-09-21 RX ADMIN — Medication 975 MILLIGRAM(S): at 14:32

## 2021-09-21 RX ADMIN — ENOXAPARIN SODIUM 30 MILLIGRAM(S): 100 INJECTION SUBCUTANEOUS at 05:06

## 2021-09-21 RX ADMIN — Medication 15 MILLIGRAM(S): at 05:07

## 2021-09-21 RX ADMIN — TAMSULOSIN HYDROCHLORIDE 0.4 MILLIGRAM(S): 0.4 CAPSULE ORAL at 22:32

## 2021-09-21 RX ADMIN — Medication 15 MILLIGRAM(S): at 05:22

## 2021-09-21 RX ADMIN — SENNA PLUS 2 TABLET(S): 8.6 TABLET ORAL at 22:32

## 2021-09-21 RX ADMIN — Medication 15 MILLIGRAM(S): at 15:00

## 2021-09-21 NOTE — CONSULT NOTE ADULT - ASSESSMENT
64 yo F PMH of HTN, HLD, presents , history of right knee pain. Patient's pain began in February 2020 when she fell forward landing on the right knee. She now has globalized pain most specifically laterally and medially. Her pain is worse with all weight-bear activity including walking long distance, rising from seated position, and using stairs. She admits to buckling and locking of the knee at times. Her knee feels unstable when she walks. She has tried physical therapy for over a year without improvement. She been taking Tylenol and over-the-counter anti-inflammatories without significant improvement. Patient had cortisone injections to the right knee in 2017 and on 5/27/21 that provided some relief.     Problem/Recommendation - 1:  ·  Problem: Unilateral primary osteoarthritis, right knee.   ·  Recommendation: s/p R TKA ,  PT/OT/pain mgmt  DVT prophylaxis- as per ortho  Abx as per SCIP- given   Incentive spirometry  Prophylaxis of opioid  induced constipation.     Problem/Recommendation - 2:  ·  Problem: HTN (hypertension).   ·  Recommendation: - continue home medications with parameters.     Problem/Recommendation - 3:  ·  Problem: HLD (hyperlipidemia).   ·  Recommendation: - continue home dose statins.     Problem/Recommendation - 4:  ·  Problem: Need for prophylactic measure.   ·  Recommendation: DVT prophylaxis  - as per ortho protocol  Opioid induced constipation  prophylaxis - bowel regimen.     Problem/Recommendation - 5:  ·  Problem: History of urinary retention.   ·  Recommendation: - on Flomax - continue - observe for postop   urinary retention     Problem / Plan :   Preop A1C - 6.2 - prediabetes - patient informed and educated ,   advised to follow up with PMD     d/w patient / nurse / ortho PA   Dispo plan is Home - likely in am    Medically stable to d/c once cleared by physical therapy / ortho . 
64 yo F PMH of HTN, HLD, presents , history of right knee pain. Patient's pain began in February 2020 when she fell forward landing on the right knee. She now has globalized pain most specifically laterally and medially. Her pain is worse with all weight-bear activity including walking long distance, rising from seated position, and using stairs. She admits to buckling and locking of the knee at times. Her knee feels unstable when she walks. She has tried physical therapy for over a year without improvement. She been taking Tylenol and over-the-counter anti-inflammatories without significant improvement. Patient had cortisone injections to the right knee in 2017 and on 5/27/21 that provided some relief.

## 2021-09-21 NOTE — CONSULT NOTE ADULT - SUBJECTIVE AND OBJECTIVE BOX
Patient seen and examined . Pain well controlled , no n/v, c/o some lightheadedness earlier this am ,   voiding without difficulty , participating with physical therapy .     CC : R knee chronic pain well controlled post op , lightheadedness this am - now resolved       MEDICATIONS  (STANDING):  acetaminophen   Tablet .. 975 milliGRAM(s) Oral every 8 hours  celecoxib 200 milliGRAM(s) Oral every 12 hours  enoxaparin Injectable 30 milliGRAM(s) SubCutaneous two times a day  HYDROmorphone  Injectable 0.5 milliGRAM(s) IV Push once  influenza   Vaccine 0.5 milliLiter(s) IntraMuscular once  ketorolac   Injectable 15 milliGRAM(s) IV Push every 6 hours  pantoprazole    Tablet 40 milliGRAM(s) Oral before breakfast  polyethylene glycol 3350 17 Gram(s) Oral at bedtime  senna 2 Tablet(s) Oral at bedtime  sodium chloride 0.9%. 1000 milliLiter(s) (100 mL/Hr) IV Continuous <Continuous>  tamsulosin 0.4 milliGRAM(s) Oral at bedtime    MEDICATIONS  (PRN):  HYDROmorphone   Tablet 4 milliGRAM(s) Oral every 3 hours PRN Severe Pain (7 - 10)  magnesium hydroxide Suspension 30 milliLiter(s) Oral daily PRN Constipation  ondansetron Injectable 4 milliGRAM(s) IV Push every 6 hours PRN Nausea and/or Vomiting  oxyCODONE    IR 5 milliGRAM(s) Oral every 3 hours PRN Mild Pain (1 - 3)  oxyCODONE    IR 10 milliGRAM(s) Oral every 3 hours PRN Moderate Pain (4 - 6)      LABS:                          10.9   8.93  )-----------( 248      ( 21 Sep 2021 06:25 )             34.1     09-21    138  |  105  |  6.4<L>  ----------------------------<  116<H>  3.6   |  22.0  |  0.46<L>    Ca    8.7      21 Sep 2021 06:25      RADIOLOGY & ADDITIONAL TESTS:  < from: Xray Knee 1 or 2 Views, Right (09.20.21 @ 13:34) >     EXAM:  XR KNEE 1-2 VIEWS RT                          PROCEDURE DATE:  09/20/2021          INTERPRETATION:  Clinical history: 66 year old female, right TKR.    Two views of the right knee are correlated with the chest CT of 8/31/2021 and demonstrate that the patient is post total arthroplasty, prosthesis is in satisfactory position.    Unremarkable postoperative soft tissue changes.    IMPRESSION:  Total arthroplasty, unremarkable postoperative views    --- End of Report ---            RAYMUNDO WILKERSON DO; Attending Radiologist  This document has been electronically signed. Sep 20 2021  2:57PM    < end of copied text >        REVIEW OF SYSTEMS:    As above , all other systems are reviewed and are negative     Vital Signs Last 24 Hrs  T(C): 36.4 (21 Sep 2021 04:25), Max: 37 (20 Sep 2021 12:38)  T(F): 97.5 (21 Sep 2021 04:25), Max: 98.6 (20 Sep 2021 12:38)  HR: 59 (21 Sep 2021 08:30) (50 - 69)  BP: 105/56 (21 Sep 2021 08:30) (98/56 - 156/66)  BP(mean): 88 (20 Sep 2021 15:30) (64 - 88)  RR: 17 (21 Sep 2021 08:30) (13 - 20)  SpO2: 99% (21 Sep 2021 08:30) (95% - 100%)  PHYSICAL EXAM:    GENERAL: NAD, well-groomed, well-developed  HEAD:  Atraumatic, Normocephalic  EYES: EOMI, PERRLA, conjunctiva and sclera clear  NECK: Supple, No JVD, Normal thyroid  NERVOUS SYSTEM:  Alert & Oriented X3, no focal deficit  CHEST/LUNG: CTA b/l ,  no  rales, rhonchi, wheezing, or rubs  HEART: Regular rate and rhythm; No murmurs, rubs, or gallops  ABDOMEN: Soft, Nontender, Nondistended; Bowel sounds present  EXTREMITIES:  2+ Peripheral Pulses, No clubbing, cyanosis, or edema,   R knee dressing + , clean and dry   LYMPH: No lymphadenopathy noted  SKIN: No rashes or lesions  
    Patient is a 66y old  Female who IS S/P R TKA , POD # 0 , tolerated procedure well , seen and   examined on PACU .     CC: R knee chronic pain       HPI:  66 yo F PMH of HTN, HLD, presents , history of right knee pain. Patient's pain began in February 2020 when she fell forward landing on the right knee. She now has globalized pain most specifically laterally and medially. Her pain is worse with all weight-bear activity including walking long distance, rising from seated position, and using stairs. She admits to buckling and locking of the knee at times. Her knee feels unstable when she walks. She has tried physical therapy for over a year without improvement. She been taking Tylenol and over-the-counter anti-inflammatories without significant improvement. Patient had cortisone injections to the right knee in 2017 and on 5/27/21 that provided some relief.  Completed COVID 19 vaccination: Pfizer x2 (8/31/21) (02 Sep 2021 09:35)      PAST MEDICAL & SURGICAL HISTORY:  Unilateral primary osteoarthritis, right knee    Hypertension    Hyperlipidemia    S/P cataract surgery, right  6/2021    H/O colonoscopy        Social History:  Tobacco -   ETOH -   Illicit drug abuse - denies    FAMILY HISTORY:  FH: kidney disease (Father)        Allergies    No Known Allergies    Intolerances        HOME MEDICATIONS :   · 	losartan 50 mg oral tablet: Last Dose Taken:  , 1 tab(s) orally once a day  · 	hydroCHLOROthiazide 12.5 mg oral capsule: Last Dose Taken:  , 1 cap(s) orally once a day          · 	tamsulosin 0.4 mg oral capsule: Last Dose Taken:  , 1 cap(s) orally once a day  REVIEW OF SYSTEMS:    R knee chronic pain , all other systems are reviewed and are negative     MEDICATIONS  (STANDING):  acetaminophen  IVPB .. 1000 milliGRAM(s) IV Intermittent <User Schedule>  ceFAZolin   IVPB 2000 milliGRAM(s) IV Intermittent <User Schedule>  HYDROmorphone  Injectable 0.5 milliGRAM(s) IV Push once  influenza   Vaccine 0.5 milliLiter(s) IntraMuscular once  ketorolac   Injectable 15 milliGRAM(s) IV Push every 6 hours  pantoprazole    Tablet 40 milliGRAM(s) Oral before breakfast  polyethylene glycol 3350 17 Gram(s) Oral at bedtime  senna 2 Tablet(s) Oral at bedtime  sodium chloride 0.9%. 1000 milliLiter(s) (100 mL/Hr) IV Continuous <Continuous>  tamsulosin 0.4 milliGRAM(s) Oral at bedtime    MEDICATIONS  (PRN):  HYDROmorphone   Tablet 4 milliGRAM(s) Oral every 3 hours PRN Severe Pain (7 - 10)  magnesium hydroxide Suspension 30 milliLiter(s) Oral daily PRN Constipation  ondansetron Injectable 4 milliGRAM(s) IV Push every 6 hours PRN Nausea and/or Vomiting  oxyCODONE    IR 5 milliGRAM(s) Oral every 3 hours PRN Mild Pain (1 - 3)  oxyCODONE    IR 10 milliGRAM(s) Oral every 3 hours PRN Moderate Pain (4 - 6)      Vital Signs Last 24 Hrs  T(C): 36.4 (20 Sep 2021 16:04), Max: 37 (20 Sep 2021 12:38)  T(F): 97.5 (20 Sep 2021 16:04), Max: 98.6 (20 Sep 2021 12:38)  HR: 62 (20 Sep 2021 16:04) (50 - 69)  BP: 111/71 (20 Sep 2021 16:04) (98/56 - 156/66)  BP(mean): 88 (20 Sep 2021 15:30) (64 - 88)  RR: 18 (20 Sep 2021 16:04) (13 - 20)  SpO2: 95% (20 Sep 2021 16:04) (95% - 100%)    PHYSICAL EXAM:    GENERAL: NAD, well-groomed, well-developed  HEAD:  Atraumatic, Normocephalic  EYES: EOMI, PERRLA, conjunctiva and sclera clear  NECK: Supple, No JVD, Normal thyroid  NERVOUS SYSTEM:  Alert & Oriented X4 , no focal deficit   CHEST/LUNG: CTA  b/l,  no rales, rhonchi, wheezing, or rubs  HEART: Regular rate and rhythm; No murmurs, rubs, or gallops  ABDOMEN: Soft, Nontender, Nondistended; Bowel sounds present  EXTREMITIES:  2+ Peripheral Pulses, No clubbing, cyanosis, or edema ,   LYMPH: No lymphadenopathy noted  SKIN: No rashes or lesions    LABS: Pending       RADIOLOGY & ADDITIONAL STUDIES:  < from: Xray Knee 1 or 2 Views, Right (09.20.21 @ 13:34) >     EXAM:  XR KNEE 1-2 VIEWS RT                          PROCEDURE DATE:  09/20/2021          INTERPRETATION:  Clinical history: 66 year old female, right TKR.    Two views of the right knee are correlated with the chest CT of 8/31/2021 and demonstrate that the patient is post total arthroplasty, prosthesis is in satisfactory position.    Unremarkable postoperative soft tissue changes.    IMPRESSION:  Total arthroplasty, unremarkable postoperative views    RAYMUNDO WILKERSON DO; Attending Radiologist  This document has been electronically signed. Sep 20 2021  2:57PM

## 2021-09-21 NOTE — CONSULT NOTE ADULT - CONSULT REASON
s/p R TKA , POD# 1 ,   postop medical mgmt requested
S/P  R TKA , POD # 0 ,  postop medical mgmt requested

## 2021-09-22 VITALS
DIASTOLIC BLOOD PRESSURE: 78 MMHG | TEMPERATURE: 98 F | OXYGEN SATURATION: 92 % | HEART RATE: 91 BPM | SYSTOLIC BLOOD PRESSURE: 126 MMHG | RESPIRATION RATE: 18 BRPM

## 2021-09-22 LAB
ANION GAP SERPL CALC-SCNC: 10 MMOL/L — SIGNIFICANT CHANGE UP (ref 5–17)
BUN SERPL-MCNC: 9.2 MG/DL — SIGNIFICANT CHANGE UP (ref 8–20)
CALCIUM SERPL-MCNC: 8.4 MG/DL — LOW (ref 8.6–10.2)
CHLORIDE SERPL-SCNC: 106 MMOL/L — SIGNIFICANT CHANGE UP (ref 98–107)
CO2 SERPL-SCNC: 21 MMOL/L — LOW (ref 22–29)
CREAT SERPL-MCNC: 0.55 MG/DL — SIGNIFICANT CHANGE UP (ref 0.5–1.3)
GLUCOSE SERPL-MCNC: 126 MG/DL — HIGH (ref 70–99)
HCT VFR BLD CALC: 28.6 % — LOW (ref 34.5–45)
HGB BLD-MCNC: 9.4 G/DL — LOW (ref 11.5–15.5)
MCHC RBC-ENTMCNC: 29.6 PG — SIGNIFICANT CHANGE UP (ref 27–34)
MCHC RBC-ENTMCNC: 32.9 GM/DL — SIGNIFICANT CHANGE UP (ref 32–36)
MCV RBC AUTO: 89.9 FL — SIGNIFICANT CHANGE UP (ref 80–100)
PLATELET # BLD AUTO: 213 K/UL — SIGNIFICANT CHANGE UP (ref 150–400)
POTASSIUM SERPL-MCNC: 3.7 MMOL/L — SIGNIFICANT CHANGE UP (ref 3.5–5.3)
POTASSIUM SERPL-SCNC: 3.7 MMOL/L — SIGNIFICANT CHANGE UP (ref 3.5–5.3)
RBC # BLD: 3.18 M/UL — LOW (ref 3.8–5.2)
RBC # FLD: 14.1 % — SIGNIFICANT CHANGE UP (ref 10.3–14.5)
SODIUM SERPL-SCNC: 137 MMOL/L — SIGNIFICANT CHANGE UP (ref 135–145)
WBC # BLD: 6.66 K/UL — SIGNIFICANT CHANGE UP (ref 3.8–10.5)
WBC # FLD AUTO: 6.66 K/UL — SIGNIFICANT CHANGE UP (ref 3.8–10.5)

## 2021-09-22 PROCEDURE — C1713: CPT

## 2021-09-22 PROCEDURE — 85027 COMPLETE CBC AUTOMATED: CPT

## 2021-09-22 PROCEDURE — 73560 X-RAY EXAM OF KNEE 1 OR 2: CPT

## 2021-09-22 PROCEDURE — C1776: CPT

## 2021-09-22 PROCEDURE — 99232 SBSQ HOSP IP/OBS MODERATE 35: CPT

## 2021-09-22 PROCEDURE — 86769 SARS-COV-2 COVID-19 ANTIBODY: CPT

## 2021-09-22 PROCEDURE — S2900: CPT

## 2021-09-22 PROCEDURE — 80048 BASIC METABOLIC PNL TOTAL CA: CPT

## 2021-09-22 PROCEDURE — 36415 COLL VENOUS BLD VENIPUNCTURE: CPT

## 2021-09-22 PROCEDURE — 97116 GAIT TRAINING THERAPY: CPT

## 2021-09-22 PROCEDURE — 97110 THERAPEUTIC EXERCISES: CPT

## 2021-09-22 PROCEDURE — 87635 SARS-COV-2 COVID-19 AMP PRB: CPT

## 2021-09-22 PROCEDURE — 82962 GLUCOSE BLOOD TEST: CPT

## 2021-09-22 PROCEDURE — 97163 PT EVAL HIGH COMPLEX 45 MIN: CPT

## 2021-09-22 RX ORDER — SODIUM CHLORIDE 9 MG/ML
500 INJECTION INTRAMUSCULAR; INTRAVENOUS; SUBCUTANEOUS ONCE
Refills: 0 | Status: COMPLETED | OUTPATIENT
Start: 2021-09-22 | End: 2021-09-22

## 2021-09-22 RX ADMIN — OXYCODONE HYDROCHLORIDE 10 MILLIGRAM(S): 5 TABLET ORAL at 00:19

## 2021-09-22 RX ADMIN — Medication 975 MILLIGRAM(S): at 06:05

## 2021-09-22 RX ADMIN — OXYCODONE HYDROCHLORIDE 10 MILLIGRAM(S): 5 TABLET ORAL at 06:05

## 2021-09-22 RX ADMIN — CELECOXIB 200 MILLIGRAM(S): 200 CAPSULE ORAL at 05:05

## 2021-09-22 RX ADMIN — Medication 975 MILLIGRAM(S): at 05:06

## 2021-09-22 RX ADMIN — ENOXAPARIN SODIUM 30 MILLIGRAM(S): 100 INJECTION SUBCUTANEOUS at 05:06

## 2021-09-22 RX ADMIN — SODIUM CHLORIDE 1000 MILLILITER(S): 9 INJECTION INTRAMUSCULAR; INTRAVENOUS; SUBCUTANEOUS at 10:06

## 2021-09-22 RX ADMIN — Medication 12.5 MILLIGRAM(S): at 05:06

## 2021-09-22 RX ADMIN — CELECOXIB 200 MILLIGRAM(S): 200 CAPSULE ORAL at 06:05

## 2021-09-22 RX ADMIN — OXYCODONE HYDROCHLORIDE 10 MILLIGRAM(S): 5 TABLET ORAL at 01:19

## 2021-09-22 RX ADMIN — OXYCODONE HYDROCHLORIDE 10 MILLIGRAM(S): 5 TABLET ORAL at 05:05

## 2021-09-22 RX ADMIN — LOSARTAN POTASSIUM 50 MILLIGRAM(S): 100 TABLET, FILM COATED ORAL at 05:05

## 2021-09-22 RX ADMIN — PANTOPRAZOLE SODIUM 40 MILLIGRAM(S): 20 TABLET, DELAYED RELEASE ORAL at 05:07

## 2021-09-22 NOTE — PROGRESS NOTE ADULT - ASSESSMENT
66 yo F PMH of HTN, HLD, presents , history of right knee pain. Patient's pain began in February 2020 when she fell forward landing on the right knee. She now has globalized pain most specifically laterally and medially. Her pain is worse with all weight-bear activity including walking long distance, rising from seated position, and using stairs. She admits to buckling and locking of the knee at times. Her knee feels unstable when she walks. She has tried physical therapy for over a year without improvement. She been taking Tylenol and over-the-counter anti-inflammatories without significant improvement. Patient had cortisone injections to the right knee in 2017 and on 5/27/21 that provided some relief.     Problem/Recommendation - 1:  ·  Problem: Unilateral primary osteoarthritis, right knee.   ·  Recommendation: s/p R TKA ,  PT/OT/pain mgmt  DVT prophylaxis- as per ortho  Abx as per SCIP- given   Incentive spirometry  Prophylaxis of opioid  induced constipation.     Problem/Recommendation - 2:  ·  Problem: HTN (hypertension). BP on lower side this am ,   patient lightheaded , likely orthostatic - will give bolus NS   ·  Recommendation: - continue home medications with parameters.     Problem/Recommendation - 3:  ·  Problem: HLD (hyperlipidemia).   ·  Recommendation: - continue home dose statins.     Problem/Recommendation - 4:  ·  Problem: Need for prophylactic measure-   ·  Recommendation: DVT prophylaxis  - as per ortho protocol- on Lovenox   Opioid induced constipation  prophylaxis - bowel regimen.     Problem/Recommendation - 5:  ·  Problem: History of urinary retention.   ·  Recommendation: - on Flomax - continue - observe for postop   urinary retention     Problem / Plan :   Preop A1C - 6.2 - prediabetes - patient informed and educated ,   advised to follow up with PMD     Acute blood lost anemia - secondary to surgical blood lost - expected blood lost     Lightheadedness - likely due to orthostasis and opioids -   will give bolus , patient advised to increase PO fluid intake .     d/w patient / nurse / ortho PA   Dispo plan is Home - likely in am    Medically stable to d/c once cleared by physical therapy / ortho   and BP > 100 after bolus

## 2021-10-06 RX ORDER — ASPIRIN/CALCIUM CARB/MAGNESIUM 324 MG
1 TABLET ORAL
Qty: 28 | Refills: 0
Start: 2021-10-06 | End: 2021-11-04

## 2021-10-06 RX ORDER — ASPIRIN/CALCIUM CARB/MAGNESIUM 324 MG
1 TABLET ORAL
Qty: 56 | Refills: 0
Start: 2021-10-06 | End: 2021-11-04

## 2021-10-14 ENCOUNTER — APPOINTMENT (OUTPATIENT)
Dept: ORTHOPEDIC SURGERY | Facility: CLINIC | Age: 66
End: 2021-10-14
Payer: MEDICARE

## 2021-10-14 VITALS
DIASTOLIC BLOOD PRESSURE: 85 MMHG | WEIGHT: 202 LBS | HEIGHT: 62 IN | SYSTOLIC BLOOD PRESSURE: 149 MMHG | BODY MASS INDEX: 37.17 KG/M2 | HEART RATE: 71 BPM

## 2021-10-14 PROCEDURE — 73562 X-RAY EXAM OF KNEE 3: CPT | Mod: RT

## 2021-10-14 PROCEDURE — 99024 POSTOP FOLLOW-UP VISIT: CPT

## 2021-10-14 NOTE — HISTORY OF PRESENT ILLNESS
[de-identified] : S/P  Right roboticassisted total knee replacement with RADHA, DOS: 9/20/21 [de-identified] : ALEJANDRA VIRAMONTES is doing well 3 week s/p right total knee replacement. She is participating in home physical therapy, as well as a home exercise program daily. Her pain level is controlled. She is taking aspirin 325 mg twice a day for DVT prophylaxis. Overall, she is very happy with the results of the surgery so far.		  [de-identified] : Exam of the right knee shows  a well healing incision, 0 to 88 degrees of flexion measured with a goniometer. 5/5 motor strength bilaterally distally. Sensation intact distally.		  [de-identified] : X-ray: 3 views of the right knee demonstrate a total knee replacement in good alignment with a well centered patella, without evidence of loosening or subsidence, and no fracture.		  [de-identified] : The patient is doing very well 3 weeks after right TKR. The patient will be transitioned to outpatient physical therapy and a prescription was given for that. Pain medication was renewed. The patient will continue aspirin 325 mg twice per day for DVT prophylaxis for the next 3 weeks. The importance of physical therapy and achieving full knee extension as well as progressing knee flexion was reinforced. Overall the patient is very happy with their outcome so far. Followup in 3 weeks with repeat x-rays		\par

## 2021-10-14 NOTE — BEGINNING OF VISIT
[Patient] : patient [] :  [[employer] ] : provided by [employer]   [Interpreters_IDNumber] : [id number] [Interpreters_FullName] : KIRA [TWNoteComboBox1] : Bur[place]ian

## 2021-10-14 NOTE — ADDENDUM
[FreeTextEntry1] : This note was authored by Dax Webster working as a medical scribe for Dr. Diego Morton. The note was reviewed, edited, and revised by Dr. Diego Morton whom is in agreement with the exam findings, imaging findings, and treatment plan. 10/14/2021

## 2021-11-04 ENCOUNTER — APPOINTMENT (OUTPATIENT)
Dept: ORTHOPEDIC SURGERY | Facility: CLINIC | Age: 66
End: 2021-11-04

## 2021-11-05 ENCOUNTER — APPOINTMENT (OUTPATIENT)
Dept: ORTHOPEDIC SURGERY | Facility: CLINIC | Age: 66
End: 2021-11-05
Payer: MEDICARE

## 2021-11-05 VITALS — BODY MASS INDEX: 37.17 KG/M2 | HEIGHT: 62 IN | WEIGHT: 202 LBS

## 2021-11-05 DIAGNOSIS — Z96.659 PRESENCE OF UNSPECIFIED ARTIFICIAL KNEE JOINT: ICD-10-CM

## 2021-11-05 PROCEDURE — 73562 X-RAY EXAM OF KNEE 3: CPT | Mod: 26,RT

## 2021-11-05 PROCEDURE — 99024 POSTOP FOLLOW-UP VISIT: CPT

## 2021-11-05 NOTE — BEGINNING OF VISIT
[Patient] : patient [] :  [[employer] ] : provided by [employer]   [Interpreters_IDNumber] : [id number] [Interpreters_FullName] : Lisa [TWNoteComboBox1] : Icelandic

## 2021-11-05 NOTE — HISTORY OF PRESENT ILLNESS
Health Maintenance Summary     Topic Due On Due Status Completed On    IMMUNIZATION - HPV  Completed Jun 11, 2008    PAP SMEAR - CERVICAL CANCER SCREENING Jun 22, 2018 Not Due Jun 22, 2015    Immunization - TDAP Pregnancy  Hidden     IMMUNIZATION - DTaP/Tdap/Td Nov 29, 2026 Not Due Nov 29, 2016    Immunization-Influenza Sep 1, 2017 Not Due Sep 28, 2016          Patient is up to date, no discussion needed .     [de-identified] : S/P  Right roboticassisted total knee replacement with RADHA, DOS: 9/20/21 [de-identified] : ALEJANDRA VIRAMONTES is doing well 6 week s/p right total knee replacement. She completed in home physical therapy, but has not started outpatient PT due to insurance issues.  Her pain level is controlled with Meloxicam, Tramadol, and Flexiril as per her pain management doctor.  She completed taking aspirin 325 mg twice a day for DVT prophylaxis. Overall, she is happy with the results of the surgery so far.		  [de-identified] : Exam of the right knee shows  a well healed incision, 0 to 90 degrees of flexion measured with a goniometer. 5/5 motor strength bilaterally distally. Sensation intact distally.		  [de-identified] : X-ray: 3 views of the right knee demonstrate a total knee replacement in good alignment with a well centered patella, without evidence of loosening or subsidence, and no fracture.		  [de-identified] : The patient is doing very well 6 weeks after right TKR. The patient will be transitioned to outpatient physical therapy and a prescription was given for that. A letter of medical necessity was also provided. The importance of physical therapy and achieving full knee extension as well as progressing knee flexion was reinforced. Overall the patient is very happy with their outcome so far. Followup in 6 weeks with repeat x-rays		\par

## 2021-11-23 ENCOUNTER — APPOINTMENT (OUTPATIENT)
Dept: ORTHOPEDIC SURGERY | Facility: CLINIC | Age: 66
End: 2021-11-23
Payer: MEDICARE

## 2021-11-23 VITALS
BODY MASS INDEX: 37.17 KG/M2 | WEIGHT: 202 LBS | HEIGHT: 62 IN | HEART RATE: 95 BPM | DIASTOLIC BLOOD PRESSURE: 83 MMHG | SYSTOLIC BLOOD PRESSURE: 140 MMHG

## 2021-11-23 PROCEDURE — 99213 OFFICE O/P EST LOW 20 MIN: CPT

## 2021-11-23 NOTE — REASON FOR VISIT
[Initial Visit] : an initial visit for [[employer] ] : provided by [employer]   [FreeTextEntry2] : right shoulder pain.  [Interpreters_IDNumber] : [id number] [Interpreters_FullName] : Morena [TWNoteComboBox1] : [place]ymraes

## 2021-11-23 NOTE — DISCUSSION/SUMMARY
[de-identified] : Assessment: 66-year-old female with right shoulder pain secondary to long head of the biceps tendinitis\par \par Plan: I had a long discussion with the patient today regarding the nature of their diagnosis and treatment plan. We discussed the risks and benefits of no treatment as well as nonoperative and operative treatments.  I reviewed the patient's MRI today with her in the office which demonstrates tendinopathy of the rotator cuff as well as a possible nondisplaced SLAP tear.  On examination she has tenderness over the biceps groove with a positive speeds test indicative of long head of biceps tendinitis.  At this time I recommend conservative treatment of the patient's condition with modalities including rest, ice, heat, anti-inflammatory medications, activity modifications, and home stretching and strengthening exercises daily. I discussed with the patient the risks and benefits associated with NSAID use.  A referral for an ultrasound-guided right shoulder biceps tendon sheath cortisone injection was provided today for symptomatic relief.  Recommend follow-up in 8 weeks for repeat clinical evaluation.  The patient continues to have pain and fails conservative treatment she may be a candidate for shoulder arthroscopy.\par \par The patient verbalizes their understanding and agrees with the plan.  All questions were answered to their satisfaction.

## 2021-11-23 NOTE — PHYSICAL EXAM
[de-identified] : General:\par Awake, alert, no acute distress, Patient was cooperative and appropriate during the examination.\par \par The patient is overweight for height and age.\par \par Ambulates with a cane status post total knee arthroplasty on her right side.\par \par Full, painless range of motion of the neck and back.\par \par Exam of the bilateral lower extremities is intact and symmetric with regards to dermatologic, vascular, and neurologic exam. Bilateral lower extremity sensation is grossly intact to light touch in the DP/SP/T/S/S nerve distributions. Intact DF/PF/EHL. BIlateral lower extremities warm and well-perfused with brisk capillary refill.\par \par \par Pulmonary:\par Regular, nonlabored breathing\par \par Abdomen:\par Soft, nontender, nondistended.\par \par Lymphatic:\par No evidence of axillary lymphadenopathy\par \par Right shoulder Exam:\par Physical exam of the shoulder demonstrates normal skin without signs of skin changes or abnormalities. No erythema, warmth, or joint effusion appreciated. \par \par Sensation intact light touch C5-T1\par Palpable radial pulse\par Radial/ulnar/median/axillary/musculocutaneous/AIN/PIN nerves grossly intact\par \par Range of motion:\par Forward Flexion: 175\par Abduction: 150\par External Rotation: 45\par Internal Rotation: L3\par \par Palpation:\par Not tender to palpation over the glenohumeral joint\par Not tender palpation over the rotator cuff insertion on the greater tuberosity\par Not tender to palpation over the AC joint\par Moderately tender to palpation of the biceps tendon/bicipital groove\par \par Strength testing:\par Supraspinatus: 5/5\par Infraspinatus: 5/5\par Subscapularis: 5/5\par \par Special test:\par Empty can test negative\par Bradley impingement test positive\par Speeds test positive\par [place]'s test negative\par Lift-off test negative\par Bell-press test negative\par Cross-arm adduction test negative\par \par  [de-identified] : MRI of the right shoulder from  radiology in April 2021 was reviewed the patient today in the office.  The patient has supraspinatus tendinosis with mild fraying of the articular surface.  There is subscapularis tendinosis.  There is degeneration of the acromioclavicular and glenohumeral joints.  There is suspicion of a nondisplaced tear involving the superior labrum.

## 2021-11-29 ENCOUNTER — RESULT REVIEW (OUTPATIENT)
Age: 66
End: 2021-11-29

## 2021-12-13 ENCOUNTER — OUTPATIENT (OUTPATIENT)
Dept: OUTPATIENT SERVICES | Facility: HOSPITAL | Age: 66
LOS: 1 days | End: 2021-12-13

## 2021-12-13 ENCOUNTER — APPOINTMENT (OUTPATIENT)
Dept: INTERVENTIONAL RADIOLOGY/VASCULAR | Facility: CLINIC | Age: 66
End: 2021-12-13
Payer: MEDICARE

## 2021-12-13 DIAGNOSIS — Z98.41 CATARACT EXTRACTION STATUS, RIGHT EYE: Chronic | ICD-10-CM

## 2021-12-13 DIAGNOSIS — M25.511 PAIN IN RIGHT SHOULDER: ICD-10-CM

## 2021-12-13 DIAGNOSIS — Z98.890 OTHER SPECIFIED POSTPROCEDURAL STATES: Chronic | ICD-10-CM

## 2021-12-13 PROCEDURE — 20611 DRAIN/INJ JOINT/BURSA W/US: CPT | Mod: RT

## 2021-12-30 ENCOUNTER — APPOINTMENT (OUTPATIENT)
Dept: ORTHOPEDIC SURGERY | Facility: CLINIC | Age: 66
End: 2021-12-30
Payer: MEDICARE

## 2021-12-30 VITALS — BODY MASS INDEX: 37.17 KG/M2 | WEIGHT: 202 LBS | HEIGHT: 62 IN

## 2021-12-30 PROCEDURE — 73562 X-RAY EXAM OF KNEE 3: CPT | Mod: 26,RT

## 2021-12-30 PROCEDURE — 99213 OFFICE O/P EST LOW 20 MIN: CPT

## 2021-12-30 RX ORDER — CELECOXIB 200 MG/1
200 CAPSULE ORAL
Qty: 60 | Refills: 0 | Status: ACTIVE | COMMUNITY
Start: 2021-12-30 | End: 1900-01-01

## 2021-12-30 NOTE — HISTORY OF PRESENT ILLNESS
[de-identified] : ALEJANDRA VIRAMONTES is a 66 year female 3 months s/p right TKR. She reports completing outpatient physical therapy and transitioning to a home exercise routine. She has returned to daily activities of life without significant pain or discomfort. She reports not taking any pain medications currently. Overall, she is very happy with the result of the surgery.

## 2021-12-30 NOTE — DISCUSSION/SUMMARY
[de-identified] : The patient is doing very well 3 months following right total knee replacement. The patient will continue their home exercises. Overall  she  is making excellent progress and is very happy with the result so far.  Dental prophylaxis was reviewed. Celebrex was renewed. Follow up one year post op for radiographic surveillance.

## 2021-12-30 NOTE — REASON FOR VISIT
[Follow-Up Visit] : a follow-up visit for [Other: ____] : [unfilled] [[employer] ] : provided by [employer]   [FreeTextEntry2] : S/P Right robotic_assisted total knee replacement with RADHA, DOS: 9/20/21.  [Interpreters_IDNumber] : [id number] [Interpreters_FullName] : Yvette [TWNoteComboBox1] : [place]

## 2021-12-30 NOTE — ADDENDUM
[FreeTextEntry1] : This note was authored by Dax Webster working as a medical scribe for Dax Dougherty. The note was reviewed, edited, and revised by Dax Dougherty whom is in agreement with the exam findings, imaging findings, and treatment plan. 12/30/2021		\par

## 2021-12-30 NOTE — PHYSICAL EXAM
[de-identified] : The patient appears well nourished  and in no apparent distress.  The patient is alert and oriented to person, place, and time.   Affect and mood appear normal. The head is normocephalic and atraumatic.  The eyes reveal normal sclera and extra ocular muscles are intact. The tongue is midline with no apparent lesions.  Skin shows normal turgor with no evidence of eczema or psoriasis.  No respiratory distress noted.  Sensation grossly intact.		  [de-identified] : Exam of the right knee shows a well healed incision, 0 to 115 degrees of flexion measured with a goniometer. .pain  5/5 motor strength bilaterally distally. Sensation intact distally.		  [de-identified] : X-ray: 3 views of the right knee demonstrate a total knee replacement in good alignment with a well centered patella, without evidence of loosening or subsidence, and no fracture.

## 2022-01-03 ENCOUNTER — APPOINTMENT (OUTPATIENT)
Dept: ORTHOPEDIC SURGERY | Facility: CLINIC | Age: 67
End: 2022-01-03
Payer: MEDICARE

## 2022-01-03 VITALS
HEART RATE: 105 BPM | BODY MASS INDEX: 37.17 KG/M2 | WEIGHT: 202 LBS | SYSTOLIC BLOOD PRESSURE: 151 MMHG | DIASTOLIC BLOOD PRESSURE: 92 MMHG | HEIGHT: 62 IN

## 2022-01-03 PROCEDURE — 99213 OFFICE O/P EST LOW 20 MIN: CPT

## 2022-03-10 ENCOUNTER — APPOINTMENT (OUTPATIENT)
Dept: ORTHOPEDIC SURGERY | Facility: CLINIC | Age: 67
End: 2022-03-10
Payer: MEDICARE

## 2022-03-10 VITALS
DIASTOLIC BLOOD PRESSURE: 86 MMHG | HEIGHT: 62 IN | HEART RATE: 76 BPM | SYSTOLIC BLOOD PRESSURE: 138 MMHG | WEIGHT: 202 LBS | BODY MASS INDEX: 37.17 KG/M2

## 2022-03-10 PROCEDURE — 99213 OFFICE O/P EST LOW 20 MIN: CPT

## 2022-03-10 NOTE — HISTORY OF PRESENT ILLNESS
[de-identified] : 03/10/2022 : SUELLEN VIRAMONTES  is a 66 year year old female who presents to the office for follow-up evaluation of her right shoulder today.  She states that since her previous injection on 12/13/2021 her right shoulder is doing much better.  She states she still has some mild pain in the anterior aspect of the shoulder that is worse when she reaches behind her and alleviated with rest.  She states medication over-the-counter and physical therapy have been helpful as well.  She would like to continue physical therapy.  She has no new complaints.  She denies any new injury.  She states her pain is improving every day.\par \par 1/3/21: Suellen is a pleasant 66-year-old right-hand-dominant Syriac-speaking female who returns to the office today for follow-up regarding her right shoulder pain secondary to biceps tendinitis.  At her previous office visit we discussed conservative treatments including anti-inflammatory medications.  She was also referred for an ultrasound-guided biceps tendon sheath injection which she received on 12/13/2021.  The patient reports substantial relief from this injection and is very happy with her overall progress.  She does have some mild residual discomfort and is interested in trying physical therapy.  The patient denies any fevers, chills, sweats, recent illnesses, numbness, tingling, weakness, or pain elsewhere at this time.\par \par 11/23/21: Suellen is a pleasant 66-year-old right-hand-dominant Syriac-speaking female presents to the office today complaining of right shoulder pain.  This visit was performed with the use of a .  The patient states that her pain began approximately 6 months ago but she denies any history of trauma.  The pain is activity related and alleviated by rest.  It hurts to reach overhead or behind.  Hurts to lean on her right shoulder.  She has seen Dr. Tuttle for this issue and he initiated a conservative treatment program including anti-inflammatory medicines and physical therapy.  Her symptoms did not substantially improve and she was scheduled for surgery until her insurance was no longer covered by him.  She reports today to see me for another opinion.  The patient recently had a right knee replacement with Dr. Morton.  The patient denies any fevers, chills, sweats, recent illnesses, numbness, tingling, weakness, or pain elsewhere at this time.

## 2022-03-10 NOTE — REASON FOR VISIT
[[employer] ] : provided by [employer]   [Initial Visit] : an initial visit for [FreeTextEntry2] : right shoulder pain  [Interpreters_IDNumber] : [id number] [Interpreters_FullName] : Pavel [TWNoteComboBox1] : Palestinian

## 2022-03-10 NOTE — DISCUSSION/SUMMARY
[de-identified] : Assessment: 66-year-old female with right shoulder pain secondary to long head of the biceps tendinitis\par \par Plan: I had a long discussion with the patient today regarding the nature of their diagnosis and treatment plan. We discussed the risks and benefits of no treatment as well as nonoperative and operative treatments.  I reviewed the patient's MRI today with her in the office which demonstrates tendinopathy of the rotator cuff as well as a possible nondisplaced SLAP tear.  On examination today she is having more biceps tenderness compared to last visit because of this I am recommending a repeat ultrasound-guided injection of the bicipital sheath be administered.  She was given a prescription today.  She will continue with physical therapy, over-the-counter anti-inflammatories for pain and inflammation.  GI precautions were discussed.  She will continue with ice, heat, rest, avoiding exacerbating activities.  She will follow up in 2 months for repeat evaluation.  If symptoms persist we discussed potential surgical intervention in the future.\par \par The patient verbalizes their understanding and agrees with the plan.  All questions were answered to their satisfaction.

## 2022-03-10 NOTE — PHYSICAL EXAM
[de-identified] : General:\par Awake, alert, no acute distress, Patient was cooperative and appropriate during the examination.\par \par The patient is overweight for height and age.\par \par Ambulates with a cane status post total knee arthroplasty on her right side.\par \par Full, painless range of motion of the neck and back.\par \par Exam of the bilateral lower extremities is intact and symmetric with regards to dermatologic, vascular, and neurologic exam. Bilateral lower extremity sensation is grossly intact to light touch in the DP/SP/T/S/S nerve distributions. Intact DF/PF/EHL. BIlateral lower extremities warm and well-perfused with brisk capillary refill.\par \par \par Pulmonary:\par Regular, nonlabored breathing\par \par Abdomen:\par Soft, nontender, nondistended.\par \par Lymphatic:\par No evidence of axillary lymphadenopathy\par \par Right shoulder Exam:\par Physical exam of the shoulder demonstrates normal skin without signs of skin changes or abnormalities. No erythema, warmth, or joint effusion appreciated. \par \par Sensation intact light touch C5-T1\par Palpable radial pulse\par Radial/ulnar/median/axillary/musculocutaneous/AIN/PIN nerves grossly intact\par \par Range of motion:\par Forward Flexion:  180\par Abduction: 150\par External Rotation: 45\par Internal Rotation: L1\par \par Palpation:\par Not tender to palpation over the glenohumeral joint\par Not tender palpation over the rotator cuff insertion on the greater tuberosity\par Not tender to palpation over the AC joint\par Moderate tender to palpation of the biceps tendon/bicipital groove\par \par Strength testing:\par Supraspinatus: 5/5\par Infraspinatus: 5/5\par Subscapularis: 5/5\par \par Special test:\par Empty can test negative\par Bradley impingement test negative\par Speeds test mildly positive\par Potosi's test negative\par Lift-off test negative\par Bell-press test negative\par Cross-arm adduction test negative\par \par  [de-identified] : MRI of the right shoulder from  radiology in April 2021 was reviewed the patient today in the office.  The patient has supraspinatus tendinosis with mild fraying of the articular surface.  There is subscapularis tendinosis.  There is degeneration of the acromioclavicular and glenohumeral joints.  There is suspicion of a nondisplaced tear involving the superior labrum.

## 2022-03-28 ENCOUNTER — RESULT REVIEW (OUTPATIENT)
Age: 67
End: 2022-03-28

## 2022-03-28 ENCOUNTER — OUTPATIENT (OUTPATIENT)
Dept: OUTPATIENT SERVICES | Facility: HOSPITAL | Age: 67
LOS: 1 days | End: 2022-03-28

## 2022-03-28 ENCOUNTER — APPOINTMENT (OUTPATIENT)
Dept: INTERVENTIONAL RADIOLOGY/VASCULAR | Facility: CLINIC | Age: 67
End: 2022-03-28
Payer: MEDICARE

## 2022-03-28 DIAGNOSIS — M25.511 PAIN IN RIGHT SHOULDER: ICD-10-CM

## 2022-03-28 DIAGNOSIS — Z98.890 OTHER SPECIFIED POSTPROCEDURAL STATES: Chronic | ICD-10-CM

## 2022-03-28 DIAGNOSIS — Z98.41 CATARACT EXTRACTION STATUS, RIGHT EYE: Chronic | ICD-10-CM

## 2022-03-28 PROCEDURE — 20611 DRAIN/INJ JOINT/BURSA W/US: CPT | Mod: RT

## 2022-04-29 ENCOUNTER — APPOINTMENT (OUTPATIENT)
Dept: ORTHOPEDIC SURGERY | Facility: CLINIC | Age: 67
End: 2022-04-29
Payer: MEDICARE

## 2022-04-29 VITALS
SYSTOLIC BLOOD PRESSURE: 129 MMHG | BODY MASS INDEX: 36.07 KG/M2 | TEMPERATURE: 98.1 F | HEIGHT: 62 IN | DIASTOLIC BLOOD PRESSURE: 84 MMHG | HEART RATE: 79 BPM | WEIGHT: 196 LBS

## 2022-04-29 DIAGNOSIS — M25.511 PAIN IN RIGHT SHOULDER: ICD-10-CM

## 2022-04-29 PROCEDURE — 99213 OFFICE O/P EST LOW 20 MIN: CPT

## 2022-04-29 NOTE — HISTORY OF PRESENT ILLNESS
[de-identified] : 04/29/2022 : SUELLEN VIRAMONTES  is a 66 year year old female who presents to the office for follow-up evaluation of her right shoulder today.  She states since the injection performed last month she is doing much better in regards to her shoulder.  She states physical therapy is helping as well and she is doing better every day.  She states he has minimal discomfort in her shoulder and only very little pain when she reaches behind her and alleviated with rest.  She would like to continue physical therapy.  She is here for routine follow-up today.  She states she is also working with therapy for her knee from her knee replacement by Dr. Blevins.  She denies any numbness or tingling distally.  She has no other complaints today.\par \par 03/10/2022 : SUELLEN VIRAMONTES  is a 66 year year old female who presents to the office for follow-up evaluation of her right shoulder today.  She states that since her previous injection on 12/13/2021 her right shoulder is doing much better.  She states she still has some mild pain in the anterior aspect of the shoulder that is worse when she reaches behind her and alleviated with rest.  She states medication over-the-counter and physical therapy have been helpful as well.  She would like to continue physical therapy.  She has no new complaints.  She denies any new injury.  She states her pain is improving every day.\par \par 1/3/21: Suellen is a pleasant 66-year-old right-hand-dominant Serbian-speaking female who returns to the office today for follow-up regarding her right shoulder pain secondary to biceps tendinitis.  At her previous office visit we discussed conservative treatments including anti-inflammatory medications.  She was also referred for an ultrasound-guided biceps tendon sheath injection which she received on 12/13/2021.  The patient reports substantial relief from this injection and is very happy with her overall progress.  She does have some mild residual discomfort and is interested in trying physical therapy.  The patient denies any fevers, chills, sweats, recent illnesses, numbness, tingling, weakness, or pain elsewhere at this time.\par \par 11/23/21: Suellen is a pleasant 66-year-old right-hand-dominant Serbian-speaking female presents to the office today complaining of right shoulder pain.  This visit was performed with the use of a .  The patient states that her pain began approximately 6 months ago but she denies any history of trauma.  The pain is activity related and alleviated by rest.  It hurts to reach overhead or behind.  Hurts to lean on her right shoulder.  She has seen Dr. Tuttle for this issue and he initiated a conservative treatment program including anti-inflammatory medicines and physical therapy.  Her symptoms did not substantially improve and she was scheduled for surgery until her insurance was no longer covered by him.  She reports today to see me for another opinion.  The patient recently had a right knee replacement with Dr. Morton.  The patient denies any fevers, chills, sweats, recent illnesses, numbness, tingling, weakness, or pain elsewhere at this time.

## 2022-04-29 NOTE — REASON FOR VISIT
[Initial Visit] : an initial visit for [[employer] ] : provided by [employer]   [FreeTextEntry2] : right shoulder pain  [Interpreters_IDNumber] : [id number] [Interpreters_FullName] : Pavel [TWNoteComboBox1] : [place] Zeal[place]

## 2022-04-29 NOTE — PHYSICAL EXAM
[de-identified] : General:\par Awake, alert, no acute distress, Patient was cooperative and appropriate during the examination.\par \par The patient is overweight for height and age.\par \par Ambulates with a cane status post total knee arthroplasty on her right side.\par \par Full, painless range of motion of the neck and back.\par \par Exam of the bilateral lower extremities is intact and symmetric with regards to dermatologic, vascular, and neurologic exam. Bilateral lower extremity sensation is grossly intact to light touch in the DP/SP/T/S/S nerve distributions. Intact DF/PF/EHL. BIlateral lower extremities warm and well-perfused with brisk capillary refill.\par \par \par Pulmonary:\par Regular, nonlabored breathing\par \par Abdomen:\par Soft, nontender, nondistended.\par \par Lymphatic:\par No evidence of axillary lymphadenopathy\par \par Right shoulder Exam:\par Physical exam of the shoulder demonstrates normal skin without signs of skin changes or abnormalities. No erythema, warmth, or joint effusion appreciated. \par \par Sensation intact light touch C5-T1\par Palpable radial pulse\par Radial/ulnar/median/axillary/musculocutaneous/AIN/PIN nerves grossly intact\par \par Range of motion:\par Forward Flexion:  180\par Abduction: 150\par External Rotation: 45\par Internal Rotation: L1\par \par Palpation:\par Not tender to palpation over the glenohumeral joint\par Not tender palpation over the rotator cuff insertion on the greater tuberosity\par Not tender to palpation over the AC joint\par Mildly tender to palpation of the biceps tendon/bicipital groove\par \par Strength testing:\par Supraspinatus: 5/5\par Infraspinatus: 5/5\par Subscapularis: 5/5\par \par Special test:\par Empty can test negative\par Bradley impingement test negative\par Speeds test mildly positive\par Vernon's test negative\par Lift-off test negative\par Bell-press test negative\par Cross-arm adduction test negative\par \par  [de-identified] : MRI of the right shoulder from  radiology in April 2021 was reviewed the patient today in the office.  The patient has supraspinatus tendinosis with mild fraying of the articular surface.  There is subscapularis tendinosis.  There is degeneration of the acromioclavicular and glenohumeral joints.  There is suspicion of a nondisplaced tear involving the superior labrum.

## 2022-04-29 NOTE — DISCUSSION/SUMMARY
[de-identified] : Assessment: 66-year-old female with right shoulder pain secondary to long head of the biceps tendinitis\par \par Plan: I had a long discussion with the patient today regarding the nature of their diagnosis and treatment plan. We discussed the risks and benefits of no treatment as well as nonoperative and operative treatments.  I reviewed the patient's MRI today with her in the office which demonstrates tendinopathy of the rotator cuff as well as a possible nondisplaced SLAP tear.  On examination today she still has some bicipital tenderness and I am recommending she continue with physical therapy and over-the-counter anti-inflammatories, ice, heat, rest, avoid exacerbating activities.  She will follow-up on an as-needed basis if her symptoms worsen or return for consideration of repeat injection versus surgical intervention pending her symptoms.  She will otherwise follow-up as needed if her symptoms are minimal.  Patient discussed and reviewed with Dr. Marrero today.\par \par The patient verbalizes their understanding and agrees with the plan.  All questions were answered to their satisfaction.

## 2022-09-30 ENCOUNTER — APPOINTMENT (OUTPATIENT)
Dept: ORTHOPEDIC SURGERY | Facility: CLINIC | Age: 67
End: 2022-09-30

## 2022-09-30 VITALS
HEIGHT: 62 IN | BODY MASS INDEX: 36.07 KG/M2 | HEART RATE: 72 BPM | SYSTOLIC BLOOD PRESSURE: 132 MMHG | DIASTOLIC BLOOD PRESSURE: 82 MMHG | WEIGHT: 196 LBS

## 2022-09-30 DIAGNOSIS — Z47.1 AFTERCARE FOLLOWING JOINT REPLACEMENT SURGERY: ICD-10-CM

## 2022-09-30 PROCEDURE — 73562 X-RAY EXAM OF KNEE 3: CPT | Mod: RT

## 2022-09-30 PROCEDURE — 99213 OFFICE O/P EST LOW 20 MIN: CPT

## 2022-09-30 NOTE — PHYSICAL EXAM
[de-identified] : The patient appears well nourished  and in no apparent distress.  The patient is alert and oriented to person, place, and time.   Affect and mood appear normal. The head is normocephalic and atraumatic.  The eyes reveal normal sclera and extra ocular muscles are intact. The tongue is midline with no apparent lesions.  Skin shows normal turgor with no evidence of eczema or psoriasis.  No respiratory distress noted.  Sensation grossly intact.		  [de-identified] : Exam of the right knee shows a well healed incision, 0 to 120 degrees of flexion measured with a goniometer. There is no pain with range of motion. \par   5/5 motor strength bilaterally distally. Sensation intact distally.		  [de-identified] : X-ray: 3 views of the right knee demonstrate a total knee replacement in good alignment with a well centered patella, without evidence of loosening or subsidence, and no fracture.

## 2022-09-30 NOTE — ADDENDUM
[FreeTextEntry1] : This note was authored by Dax Webster working as a medical scribe for Dr. Diego Morton. The note was reviewed, edited, and revised by Dr. Diego Morton whom is in agreement with the exam findings, imaging findings, and treatment plan. 09/30/2022

## 2022-09-30 NOTE — DISCUSSION/SUMMARY
[de-identified] : The patient is doing very well 1 year following total right knee replacement. The patient will continue their home exercises. Overall the patient is very happy with the outcome of the surgery.  Dental prophylaxis was reviewed. Follow up in three to five years for radiographic surveillance.

## 2022-09-30 NOTE — REASON FOR VISIT
[Follow-Up Visit] : a follow-up visit for [Other: ____] : [unfilled] [[employer] ] : provided by [employer]   [FreeTextEntry2] : S/P Right robotic_assisted total knee replacement with RADHA, DOS: 9/20/21.  [Interpreters_IDNumber] : [id number] [Interpreters_FullName] : Dean [TWNoteComboBox1] : [place]ym[place]

## 2022-09-30 NOTE — HISTORY OF PRESENT ILLNESS
[de-identified] : Patient is a 67 year old female who presents for follow up right TKA from 9/2021. patient states overall doing well, has discomfort when knee and has mild pain when long distances. patient states discomfort is diffuse when present.  Patients states does not effect ADL

## 2023-02-27 ENCOUNTER — OFFICE (OUTPATIENT)
Dept: URBAN - METROPOLITAN AREA CLINIC 112 | Facility: CLINIC | Age: 68
Setting detail: OPHTHALMOLOGY
End: 2023-02-27
Payer: MEDICARE

## 2023-02-27 DIAGNOSIS — H35.033: ICD-10-CM

## 2023-02-27 DIAGNOSIS — H25.13: ICD-10-CM

## 2023-02-27 DIAGNOSIS — H04.123: ICD-10-CM

## 2023-02-27 DIAGNOSIS — H40.053: ICD-10-CM

## 2023-02-27 PROCEDURE — 92012 INTRM OPH EXAM EST PATIENT: CPT | Performed by: OPHTHALMOLOGY

## 2023-02-27 PROCEDURE — 92133 CPTRZD OPH DX IMG PST SGM ON: CPT | Performed by: OPHTHALMOLOGY

## 2023-02-27 ASSESSMENT — SPHEQUIV_DERIVED
OS_SPHEQUIV: 2.75
OS_SPHEQUIV: 2.625
OD_SPHEQUIV: 3
OD_SPHEQUIV: 3.125

## 2023-02-27 ASSESSMENT — REFRACTION_CURRENTRX
OS_SPHERE: +2.50
OD_OVR_VA: 20/
OS_SPHERE: +2.00
OS_OVR_VA: 20/
OD_VPRISM_DIRECTION: BF
OS_CYLINDER: -0.50
OD_CYLINDER: -0.75
OS_CYLINDER: -0.50
OS_VPRISM_DIRECTION: BF
OD_AXIS: 096
OS_SPHERE: +2.50
OS_AXIS: 062
OS_AXIS: 066
OS_VPRISM_DIRECTION: BF
OD_VPRISM_DIRECTION: BF
OD_SPHERE: +3.00
OS_ADD: +2.25
OD_ADD: +2.25
OD_AXIS: 096
OS_CYLINDER: -0.50
OD_ADD: +2.25
OD_CYLINDER: -0.75
OD_OVR_VA: 20/
OS_OVR_VA: 20/
OD_ADD: +2.50
OD_SPHERE: +2.75
OS_ADD: +2.25
OS_AXIS: 071
OD_VPRISM_DIRECTION: BF
OS_OVR_VA: 20/
OD_SPHERE: +2.25
OD_CYLINDER: -1.00
OS_ADD: +2.50
OD_OVR_VA: 20/
OD_AXIS: 098

## 2023-02-27 ASSESSMENT — REFRACTION_AUTOREFRACTION
OD_CYLINDER: -1.25
OS_CYLINDER: -1.25
OD_AXIS: 105
OD_SPHERE: +3.75
OS_SPHERE: +3.25
OS_AXIS: 080

## 2023-02-27 ASSESSMENT — LID EXAM ASSESSMENTS
OD_EDEMA: RLL RUL
OD_ECCHYMOSIS: RUL
OS_EDEMA: LLL LUL
OS_ECCHYMOSIS: LUL

## 2023-02-27 ASSESSMENT — KERATOMETRY
OD_K2POWER_DIOPTERS: 43.25
OS_AXISANGLE_DEGREES: 159
OD_AXISANGLE_DEGREES: 017
OS_K1POWER_DIOPTERS: 42.50
OS_K2POWER_DIOPTERS: 43.25
METHOD_AUTO_MANUAL: AUTO
OD_K1POWER_DIOPTERS: 42.25

## 2023-02-27 ASSESSMENT — CONFRONTATIONAL VISUAL FIELD TEST (CVF)
OD_FINDINGS: FULL
OS_FINDINGS: FULL

## 2023-02-27 ASSESSMENT — AXIALLENGTH_DERIVED
OS_AL: 22.8246
OS_AL: 22.7791
OD_AL: 22.686
OD_AL: 22.7312

## 2023-02-27 ASSESSMENT — REFRACTION_MANIFEST
OS_AXIS: 083
OS_CYLINDER: -1.00
OS_SPHERE: +3.25
OS_VA1: 20/20
OD_CYLINDER: -1.00
OD_VA1: 20/30
OD_AXIS: 117
OD_SPHERE: +3.50

## 2023-02-27 ASSESSMENT — VISUAL ACUITY
OD_BCVA: 20/25-1
OS_BCVA: 20/30+1

## 2023-04-14 ENCOUNTER — APPOINTMENT (OUTPATIENT)
Dept: OTOLARYNGOLOGY | Facility: CLINIC | Age: 68
End: 2023-04-14
Payer: MEDICARE

## 2023-04-14 VITALS
BODY MASS INDEX: 38.28 KG/M2 | HEART RATE: 78 BPM | HEIGHT: 62 IN | WEIGHT: 208 LBS | DIASTOLIC BLOOD PRESSURE: 84 MMHG | SYSTOLIC BLOOD PRESSURE: 145 MMHG

## 2023-04-14 DIAGNOSIS — E78.5 HYPERLIPIDEMIA, UNSPECIFIED: ICD-10-CM

## 2023-04-14 DIAGNOSIS — S01.312A LACERATION W/OUT FOREIGN BODY OF LEFT EAR, INITIAL ENCOUNTER: ICD-10-CM

## 2023-04-14 DIAGNOSIS — E11.9 TYPE 2 DIABETES MELLITUS W/OUT COMPLICATIONS: ICD-10-CM

## 2023-04-14 DIAGNOSIS — S01.311A LACERATION W/OUT FOREIGN BODY OF RIGHT EAR, INITIAL ENCOUNTER: ICD-10-CM

## 2023-04-14 DIAGNOSIS — I10 ESSENTIAL (PRIMARY) HYPERTENSION: ICD-10-CM

## 2023-04-14 PROCEDURE — 99202 OFFICE O/P NEW SF 15 MIN: CPT

## 2023-04-14 RX ORDER — TAMSULOSIN HYDROCHLORIDE 0.4 MG/1
0.4 CAPSULE ORAL
Qty: 30 | Refills: 0 | Status: COMPLETED | COMMUNITY
Start: 2021-08-28 | End: 2023-04-14

## 2023-04-14 RX ORDER — OFLOXACIN 3 MG/ML
0.3 SOLUTION/ DROPS OPHTHALMIC
Qty: 5 | Refills: 0 | Status: COMPLETED | COMMUNITY
Start: 2021-08-20 | End: 2023-04-14

## 2023-04-14 RX ORDER — FLUTICASONE PROPIONATE 50 UG/1
50 SPRAY, METERED NASAL
Qty: 16 | Refills: 0 | Status: COMPLETED | COMMUNITY
Start: 2021-07-06 | End: 2023-04-14

## 2023-04-14 RX ORDER — NEOMYCIN AND POLYMYXIN B SULFATES AND DEXAMETHASONE 3.5; 10000; 1 MG/G; [IU]/G; MG/G
3.5-10000-0.1 OINTMENT OPHTHALMIC
Qty: 4 | Refills: 0 | Status: COMPLETED | COMMUNITY
Start: 2021-08-17 | End: 2023-04-14

## 2023-04-14 NOTE — REASON FOR VISIT
[Initial Consultation] : an initial consultation for [FreeTextEntry2] : bilateral ear lobe repair  [Interpreters_IDNumber] : [id number] [Interpreters_FullName] : Suellen  [TWNoteComboBox1] : Ukrainian

## 2023-04-14 NOTE — ASSESSMENT
[FreeTextEntry1] : 67 year old female with bilateral ear lobe tear - to schedule for ear lobe repair in the office.

## 2023-04-14 NOTE — PHYSICAL EXAM
[Midline] : trachea located in midline position [de-identified] : right ear lobe complete tear\par left ear lobe partial tear [Normal] : no rashes

## 2023-04-14 NOTE — HISTORY OF PRESENT ILLNESS
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 \par \par

## 2023-05-26 ENCOUNTER — APPOINTMENT (OUTPATIENT)
Dept: OTOLARYNGOLOGY | Facility: CLINIC | Age: 68
End: 2023-05-26
Payer: MEDICARE

## 2023-05-26 VITALS
SYSTOLIC BLOOD PRESSURE: 121 MMHG | WEIGHT: 208 LBS | HEART RATE: 71 BPM | DIASTOLIC BLOOD PRESSURE: 78 MMHG | BODY MASS INDEX: 38.28 KG/M2 | HEIGHT: 62 IN

## 2023-05-26 PROCEDURE — 13152 CMPLX RPR E/N/E/L 2.6-7.5 CM: CPT

## 2023-05-26 NOTE — PROCEDURE
[FreeTextEntry1] : Bilateral ear lobe repair  [FreeTextEntry3] : Interpretation provided by: [employer] \par Name: Marissa ([id number]) \par Language: Portuguese\par \par Patient name: ALEJANDRA VIRAMONTES \par \par MRN: [id number] \par \par Date: May 26 2023 10:00AM \par \par Surgeon: Yaima Ramey MD\par \par Assistants: None\par \par Procedure: right ear lobe repair 2cm \par \par Pre-operative diagnosis: Right ear lobe laceration 2 cm\par Post-operative diagnosis: same\par \par Indications: This is a 67 year female with a neck lipoma that has been growing in size. All RBA were discussed with the patient, including risk of scar, infection wound dehiscence.\par \par Procedure: After written informed consent and a surgical timeout, the area was injected with 1% lidocaine with epinephrine and 0.25% marcaine with epinephrine 1:1 mixture. After anesthesia, the area was prepped with iodine and draped in a sterile fashion. A linear incision was made with a 15 blade in a neck crease. This was 2 cm. The subcutaneous tissue was incised to the muscle. After dissection, the lipoma was location and this was finely dissected off of the surrounding soft tissue. The lesions was sent in formalin to pathology. The wound was irrigated. Hemostasis was achieved. The deep layer was closed with a 4-0 monocryl suture. The subcutaneous layer was closed with a 5-0 monocryl suture. The skin edges were reapproximated with a 6-0 nylone suture. The wound was cleaned and bacitracin was applied. A pressure dressing was then applied. Patient tolerated well.\par \par EBL: Minimal\par \par Specimens: None\par \par Disposition: Discharge to home. Post-op instructions given.\par \par Date: May 26 2023 10:00AM \par \par Surgeon: Yaima Ramey MD\par \par Assistants: None\par \par Pre-operative Diagnosis: Complete tear of bilateral ear lobe\par \par Post-operative Diagnosis: same\par \par Procedure: Repair of bilateral ar lobe tear complete length 8 cm\par \par Indications: \par This is a 67 year female with ear lobe tear after trauma. All risks, benefits, and alternatives to repair of the ear lobe were explained. Patient gave informed consent to proceed. \par \par Details: After informed consent and surgical timeout, bilateral ear lobe were injected with 1% lidocaine with epinephrine. The ears were prepped and draped in a sterile fashion. On the right side, the piercing was incised circumferentially and extending anterior to posterior. This was excised. A deep subcutaneous suture was placed with a 5-0 monocryl suture to reapproximated deep edges. The edges were aligned and sutured closed with a 6-0 nylon suture in vertical mattress fashion for optimal skin eversion. The wound was cleaned and bacitracin was applied.\par \par EBL: minimal\par \par Specimens: None\par \par Disposition: discharge to home, patient given instructions\par \par Condition: Stable

## 2023-06-02 ENCOUNTER — APPOINTMENT (OUTPATIENT)
Dept: OTOLARYNGOLOGY | Facility: CLINIC | Age: 68
End: 2023-06-02
Payer: MEDICARE

## 2023-06-02 VITALS — DIASTOLIC BLOOD PRESSURE: 81 MMHG | SYSTOLIC BLOOD PRESSURE: 127 MMHG | HEART RATE: 66 BPM

## 2023-06-02 PROCEDURE — 99024 POSTOP FOLLOW-UP VISIT: CPT

## 2023-06-02 NOTE — ASSESSMENT
[FreeTextEntry1] : 67 year old female with bilateral ear lobe tear s/p repair. Mastisol steristrips applied today. Can repierce in 8 weeks.

## 2023-06-02 NOTE — REASON FOR VISIT
[de-identified] : Repair of bilateral ar lobe tear complete length 8 cm [de-identified] : 05/26/23 [de-identified] : States doing well - sutures intact. Denies pain, bleeding and drainage.

## 2023-06-22 NOTE — DISCHARGE NOTE PROVIDER - NSDCCAREPROVSEEN_GEN_ALL_CORE_FT
Diego Morton Elidel Counseling: Patient may experience a mild burning sensation during topical application. Elidel is not approved in children less than 2 years of age. There have been case reports of hematologic and skin malignancies in patients using topical calcineurin inhibitors although causality is questionable.

## 2023-07-21 ENCOUNTER — OFFICE (OUTPATIENT)
Dept: URBAN - METROPOLITAN AREA CLINIC 94 | Facility: CLINIC | Age: 68
Setting detail: OPHTHALMOLOGY
End: 2023-07-21
Payer: MEDICARE

## 2023-07-21 DIAGNOSIS — H04.123: ICD-10-CM

## 2023-07-21 DIAGNOSIS — H40.053: ICD-10-CM

## 2023-07-21 DIAGNOSIS — H35.033: ICD-10-CM

## 2023-07-21 DIAGNOSIS — H25.13: ICD-10-CM

## 2023-07-21 PROCEDURE — 92250 FUNDUS PHOTOGRAPHY W/I&R: CPT | Performed by: OPHTHALMOLOGY

## 2023-07-21 PROCEDURE — 99213 OFFICE O/P EST LOW 20 MIN: CPT | Performed by: OPHTHALMOLOGY

## 2023-07-21 PROCEDURE — 83861 MICROFLUID ANALY TEARS: CPT | Performed by: OPHTHALMOLOGY

## 2023-07-21 ASSESSMENT — KERATOMETRY
OD_K1POWER_DIOPTERS: 43.00
OD_CYLAXISANGLE_DEGREES: 116
OD_CYLPOWER_DEGREES: 0.25
OD_AXISANGLE2_DEGREES: 116
OD_AXISANGLE_DEGREES: 26
OD_K1K2_AVERAGE: 43.125
OS_K2POWER_DIOPTERS: 43.25
OD_K2POWER_DIOPTERS: 43.25
OS_AXISANGLE_DEGREES: 70
OS_K1POWER_DIOPTERS: 42.25
OS_K1POWER_DIOPTERS: 42.25
OS_K2POWER_DIOPTERS: 43.25
OD_AXISANGLE_DEGREES: 116
METHOD_AUTO_MANUAL: AUTO
OS_CYLAXISANGLE_DEGREES: 160
OS_AXISANGLE2_DEGREES: 160
OD_K1POWER_DIOPTERS: 43.00
OD_K2POWER_DIOPTERS: 43.25
OS_CYLPOWER_DEGREES: 1
OS_AXISANGLE_DEGREES: 160
OS_K1K2_AVERAGE: 42.75

## 2023-07-21 ASSESSMENT — REFRACTION_MANIFEST
OS_VA1: 20/20
OD_CYLINDER: -1.00
OS_SPHERE: +3.25
OS_CYLINDER: -1.00
OD_AXIS: 117
OD_VA1: 20/30
OD_SPHERE: +3.50
OS_AXIS: 083

## 2023-07-21 ASSESSMENT — REFRACTION_CURRENTRX
OD_VPRISM_DIRECTION: BF
OS_AXIS: 071
OD_ADD: +2.25
OD_VPRISM_DIRECTION: BF
OS_OVR_VA: 20/
OD_OVR_VA: 20/
OD_AXIS: 096
OS_AXIS: 062
OS_SPHERE: +2.50
OS_CYLINDER: -0.50
OS_ADD: +2.25
OS_VPRISM_DIRECTION: BF
OD_AXIS: 096
OD_VPRISM_DIRECTION: BF
OD_OVR_VA: 20/
OS_SPHERE: +3.00
OD_OVR_VA: 20/
OS_VPRISM_DIRECTION: BF
OS_ADD: +2.25
OD_CYLINDER: -1.00
OD_CYLINDER: -1.25
OD_ADD: +2.25
OD_SPHERE: +3.00
OS_VPRISM_DIRECTION: BF
OD_SPHERE: +2.75
OD_AXIS: 108
OS_OVR_VA: 20/
OS_CYLINDER: -0.50
OS_CYLINDER: -0.50
OS_ADD: +2.25
OD_SPHERE: +3.25
OD_ADD: +2.25
OD_CYLINDER: -0.75
OS_AXIS: 082
OS_OVR_VA: 20/
OS_SPHERE: +2.50

## 2023-07-21 ASSESSMENT — REFRACTION_AUTOREFRACTION
OD_CYLINDER: -1.00
OS_CYLINDER: -1.75
OS_SPHERE: +4.00
OS_AXIS: 085
OD_SPHERE: +3.25
OD_AXIS: 095

## 2023-07-21 ASSESSMENT — SPHEQUIV_DERIVED
OD_SPHEQUIV: 3
OD_SPHEQUIV: 2.75
OS_SPHEQUIV: 2.75
OS_SPHEQUIV: 3.125

## 2023-07-21 ASSESSMENT — AXIALLENGTH_DERIVED
OD_AL: 22.6938
OS_AL: 22.8219
OS_AL: 22.686
OD_AL: 22.604

## 2023-07-21 ASSESSMENT — TONOMETRY: OD_IOP_MMHG: 19

## 2023-07-21 ASSESSMENT — LID EXAM ASSESSMENTS
OS_ECCHYMOSIS: LUL
OS_EDEMA: LLL LUL
OD_ECCHYMOSIS: RUL
OD_EDEMA: RLL RUL

## 2023-07-21 ASSESSMENT — CONFRONTATIONAL VISUAL FIELD TEST (CVF)
OS_FINDINGS: FULL
OD_FINDINGS: FULL

## 2023-07-21 ASSESSMENT — VISUAL ACUITY
OS_BCVA: 20/25-
OD_BCVA: 20/25-

## 2023-09-01 ENCOUNTER — APPOINTMENT (OUTPATIENT)
Dept: OTOLARYNGOLOGY | Facility: CLINIC | Age: 68
End: 2023-09-01

## 2024-03-14 ENCOUNTER — APPOINTMENT (OUTPATIENT)
Dept: ORTHOPEDIC SURGERY | Facility: CLINIC | Age: 69
End: 2024-03-14
Payer: MEDICARE

## 2024-03-14 VITALS
WEIGHT: 208 LBS | DIASTOLIC BLOOD PRESSURE: 80 MMHG | SYSTOLIC BLOOD PRESSURE: 134 MMHG | BODY MASS INDEX: 38.28 KG/M2 | HEIGHT: 62 IN

## 2024-03-14 DIAGNOSIS — Z96.651 PRESENCE OF RIGHT ARTIFICIAL KNEE JOINT: ICD-10-CM

## 2024-03-14 PROCEDURE — 99213 OFFICE O/P EST LOW 20 MIN: CPT

## 2024-03-14 PROCEDURE — 73562 X-RAY EXAM OF KNEE 3: CPT | Mod: RT

## 2024-03-14 NOTE — ADDENDUM
[FreeTextEntry1] : This note was authored by Dax Webster working as a medical scribe for Dr. Diego Morton. The note was reviewed, edited, and revised by Dr. Diego Morton whom is in agreement with the exam findings, imaging findings, and treatment plan. 03/14/2024

## 2024-03-14 NOTE — DISCUSSION/SUMMARY
[de-identified] : The patient is doing very well 2.5 years following right total knee replacement. The patient will continue their home exercises. Overall the patient is very happy with the outcome of the surgery.  She presented today requesting disability from work which at this time I don't believe is indicated.  Her knee is functioning very well and she is observed to walk down the office hallway at normal speed with a totally normal gait.  Dental prophylaxis was reviewed. Follow up in 5 years for radiographic surveillance.

## 2024-03-14 NOTE — PHYSICAL EXAM
[de-identified] : The patient appears well nourished and in no apparent distress.  The patient is alert and oriented to person, place, and time.   Affect and mood appear normal. The head is normocephalic and atraumatic.  The eyes reveal normal sclera and extra ocular muscles are intact. The tongue is midline with no apparent lesions.  Skin shows normal turgor with no evidence of eczema or psoriasis.  No respiratory distress noted.  Sensation grossly intact.		  [de-identified] : Exam of the right knee shows a well healed incision, +5 to 117 degrees of flexion measured with a goniometer. There is no effusion.  5/5 motor strength bilaterally distally. Sensation intact distally.		  [de-identified] : X-ray: 3 views of the right knee demonstrate a total knee replacement in good alignment with a well centered patella, without evidence of loosening or subsidence, and no fracture.

## 2024-03-14 NOTE — HISTORY OF PRESENT ILLNESS
[de-identified] : ALEJANDRA VIRAMONTES is a 68 year female presenting 2.5 years s/p right total knee replacement. The patient reports continuing a home exercise routine. She reports some residual "numbness" in the knee with standing for prolonged periods of time but this is not precluding her from ADL's. The patient has returned to daily activities of life without significant pain or discomfort and is working.

## 2024-03-14 NOTE — REASON FOR VISIT
[Follow-Up Visit] : a follow-up visit for [Other: ____] : [unfilled] [[employer] ] : provided by [employer]   [Interpreters_IDNumber] : [id number] [Interpreters_FullName] : Warner [TWNoteComboBox1] : Cook Islander

## 2024-03-15 ENCOUNTER — OFFICE (OUTPATIENT)
Dept: URBAN - METROPOLITAN AREA CLINIC 94 | Facility: CLINIC | Age: 69
Setting detail: OPHTHALMOLOGY
End: 2024-03-15
Payer: MEDICARE

## 2024-03-15 DIAGNOSIS — H35.033: ICD-10-CM

## 2024-03-15 DIAGNOSIS — H40.053: ICD-10-CM

## 2024-03-15 DIAGNOSIS — H04.121: ICD-10-CM

## 2024-03-15 DIAGNOSIS — H25.13: ICD-10-CM

## 2024-03-15 DIAGNOSIS — H04.123: ICD-10-CM

## 2024-03-15 DIAGNOSIS — H04.122: ICD-10-CM

## 2024-03-15 PROCEDURE — 99214 OFFICE O/P EST MOD 30 MIN: CPT | Performed by: OPHTHALMOLOGY

## 2024-03-15 PROCEDURE — 83861 MICROFLUID ANALY TEARS: CPT | Mod: RT | Performed by: OPHTHALMOLOGY

## 2024-03-15 PROCEDURE — 83861 MICROFLUID ANALY TEARS: CPT | Mod: LT | Performed by: OPHTHALMOLOGY

## 2024-03-15 ASSESSMENT — LID EXAM ASSESSMENTS
OD_ECCHYMOSIS: RUL
OS_EDEMA: LLL LUL
OD_EDEMA: RLL RUL
OS_ECCHYMOSIS: LUL

## 2024-03-15 ASSESSMENT — REFRACTION_CURRENTRX
OS_ADD: +2.25
OS_VPRISM_DIRECTION: BF
OS_OVR_VA: 20/
OS_SPHERE: +2.50
OS_VPRISM_DIRECTION: BF
OD_AXIS: 096
OD_OVR_VA: 20/
OS_ADD: +2.25
OS_OVR_VA: 20/
OD_SPHERE: +3.00
OD_ADD: +2.25
OS_SPHERE: +2.50
OD_CYLINDER: -0.75
OS_AXIS: 062
OD_CYLINDER: -1.00
OD_VPRISM_DIRECTION: BF
OD_OVR_VA: 20/
OS_VPRISM_DIRECTION: BF
OS_CYLINDER: -0.50
OD_VPRISM_DIRECTION: BF
OS_AXIS: 071
OS_ADD: +2.25
OS_CYLINDER: -0.50
OD_SPHERE: +3.25
OD_CYLINDER: -1.25
OS_OVR_VA: 20/
OD_ADD: +2.25
OS_AXIS: 082
OD_AXIS: 108
OS_SPHERE: +3.00
OS_CYLINDER: -0.50
OD_AXIS: 096
OD_ADD: +2.25
OD_OVR_VA: 20/
OD_VPRISM_DIRECTION: BF
OD_SPHERE: +2.75

## 2024-03-15 ASSESSMENT — REFRACTION_MANIFEST
OD_CYLINDER: -1.00
OD_AXIS: 117
OS_CYLINDER: -1.00
OS_SPHERE: +3.25
OS_VA1: 20/20
OD_SPHERE: +3.50
OD_VA1: 20/30
OS_AXIS: 083

## 2024-03-15 ASSESSMENT — SPHEQUIV_DERIVED
OS_SPHEQUIV: 2.75
OD_SPHEQUIV: 3

## 2024-04-12 ENCOUNTER — OFFICE (OUTPATIENT)
Dept: URBAN - METROPOLITAN AREA CLINIC 94 | Facility: CLINIC | Age: 69
Setting detail: OPHTHALMOLOGY
End: 2024-04-12
Payer: MEDICARE

## 2024-04-12 DIAGNOSIS — H40.053: ICD-10-CM

## 2024-04-12 DIAGNOSIS — H35.033: ICD-10-CM

## 2024-04-12 DIAGNOSIS — H04.123: ICD-10-CM

## 2024-04-12 DIAGNOSIS — H25.13: ICD-10-CM

## 2024-04-12 DIAGNOSIS — H25.11: ICD-10-CM

## 2024-04-12 PROCEDURE — 99213 OFFICE O/P EST LOW 20 MIN: CPT | Performed by: OPHTHALMOLOGY

## 2024-04-12 PROCEDURE — 92136 OPHTHALMIC BIOMETRY: CPT | Mod: RT | Performed by: OPHTHALMOLOGY

## 2024-04-12 ASSESSMENT — LID EXAM ASSESSMENTS
OS_EDEMA: LLL LUL
OD_ECCHYMOSIS: RUL
OD_EDEMA: RLL RUL
OS_ECCHYMOSIS: LUL

## 2024-08-29 ENCOUNTER — APPOINTMENT (OUTPATIENT)
Dept: DERMATOLOGY | Facility: CLINIC | Age: 69
End: 2024-08-29
Payer: MEDICARE

## 2024-08-29 PROCEDURE — 11102 TANGNTL BX SKIN SINGLE LES: CPT

## 2024-08-29 PROCEDURE — 99203 OFFICE O/P NEW LOW 30 MIN: CPT | Mod: 25

## 2024-08-29 PROCEDURE — 11103 TANGNTL BX SKIN EA SEP/ADDL: CPT | Mod: 59

## 2024-11-07 NOTE — DISCHARGE NOTE PROVIDER - NS AS DC PROVIDER CONTACT Y/N MULTI
Automatic Reconciliation, Ar   Armodafinil 250 MG TABS Take 250 mg by mouth every morning (before breakfast). Automatic Reconciliation, Ar   aspirin 81 MG EC tablet Take by mouth daily    Automatic Reconciliation, Ar   atorvastatin (LIPITOR) 40 MG tablet Take 1 tablet by mouth    Automatic Reconciliation, Ar   vitamin D (CHOLECALCIFEROL) 25 MCG (1000 UT) TABS tablet Take 1 tablet by mouth daily    Automatic Reconciliation, Ar   docusate (COLACE, DULCOLAX) 100 MG CAPS Take 100 mg by mouth 2 times daily    Automatic Reconciliation, Ar   empagliflozin (JARDIANCE) 25 MG tablet Take 1 tablet by mouth daily    Automatic Reconciliation, Ar   Exenatide 2 MG PEN Inject 1 pen into the skin every 7 days    Automatic Reconciliation, Ar   ferrous sulfate (IRON 325) 325 (65 Fe) MG tablet Take 1 tablet by mouth every morning (before breakfast)    Automatic Reconciliation, Ar   folic acid (FOLVITE) 1 MG tablet Take by mouth daily    Automatic Reconciliation, Ar   glyBURIDE-metFORMIN (GLUCOVANCE) 5-500 MG per tablet Take 1 tablet by mouth    Automatic Reconciliation, Ar   HYDROcodone-acetaminophen (NORCO) 5-325 MG per tablet Take 1 tablet by mouth 2 times daily.  9/12/22   Automatic Reconciliation, Ar   ibuprofen (ADVIL;MOTRIN) 200 MG tablet Take 2 tablets by mouth as needed    Automatic Reconciliation, Ar   levocetirizine (XYZAL) 5 MG tablet Take 1 tablet by mouth daily    Automatic Reconciliation, Ar   lisinopril (PRINIVIL;ZESTRIL) 40 MG tablet Take 1 tablet by mouth    Automatic Reconciliation, Ar   metoprolol succinate (TOPROL XL) 100 MG extended release tablet Take 1 tablet by mouth    Automatic Reconciliation, Ar   montelukast (SINGULAIR) 10 MG tablet Take 1 tablet by mouth daily    Automatic Reconciliation, Ar   pantoprazole (PROTONIX) 40 MG tablet Take 1 tablet by mouth 2 times daily    Automatic Reconciliation, Ar   sertraline (ZOLOFT) 100 MG tablet Take 2 tablets by mouth    Automatic Reconciliation, Ar   traMADol Acute hyponatremia Acute hyponatremia Yes

## 2024-11-11 NOTE — ED PROVIDER NOTE - PATIENT PORTAL LINK FT
Patient's EF (Ejection Fraction) is less than 40%    Heart Failure Medications:  Diuretics:: Spironolactone    (One of the following REQUIRED for EF <40%/SYSTOLIC FAILURE but MAY be used in EF% >40%/DIASTOLIC FAILURE)        ACE:: None        ARB:: Valsartan         ARNI:: None    (Beta Blockers)  NON- Evidenced Based Beta Blocker (for EF% >40%/DIASTOLIC FAILURE): None    Evidenced Based Beta Blocker::(REQUIRED for EF% <40%/SYSTOLIC FAILURE) Carvedilol- Coreg  ...................................................................................................................................................    Patient's Last Weight: 99.8 kg obtained by bed scale.   Intake/Output Summary (Last 24 hours) at 11/11/2024 1543  Last data filed at 11/11/2024 1315  Gross per 24 hour   Intake 2294.88 ml   Output 6720 ml   Net -4425.12 ml       CHF Education booklet provided: yes    Comorbidities Reviewed Yes  Patient has a past medical history of Sarcoidosis.     >> For CHF and Comorbidity Education Time and Topics, please see Education Tab.    Progressive Mobility Assessment:  What is this patient's Current Level of Mobility?: Requires Bed Rest  How was this patient Mobilized today?: Unable to Mobilize                 With Whom? Nurse                 Level of Difficulty/Assistance: 2x Assist     Pt is currently on 1 L O2. Pt with pitting lower extremity edema. Patient's weights and intake/output reviewed:      Patient and/or Family's stated Goal of Care this Admission: reduce shortness of breath, increase activity tolerance, better understand heart failure and disease management, be more comfortable, and reduce lower extremity edema prior to discharge        You can access the FollowMyHealth Patient Portal offered by [facility] by registering at the following website: [email or web address]. By joining [facility]’s FollowMyHealth portal, you will also be able to view your health information using other applications (apps) compatible with our system.

## 2025-01-22 ENCOUNTER — OFFICE (OUTPATIENT)
Dept: URBAN - METROPOLITAN AREA CLINIC 94 | Facility: CLINIC | Age: 70
Setting detail: OPHTHALMOLOGY
End: 2025-01-22
Payer: COMMERCIAL

## 2025-01-22 ENCOUNTER — RX ONLY (RX ONLY)
Age: 70
End: 2025-01-22

## 2025-01-22 DIAGNOSIS — H40.053: ICD-10-CM

## 2025-01-22 DIAGNOSIS — H25.13: ICD-10-CM

## 2025-01-22 DIAGNOSIS — H04.123: ICD-10-CM

## 2025-01-22 DIAGNOSIS — H35.033: ICD-10-CM

## 2025-01-22 DIAGNOSIS — H04.553: ICD-10-CM

## 2025-01-22 PROCEDURE — 92012 INTRM OPH EXAM EST PATIENT: CPT | Performed by: OPHTHALMOLOGY

## 2025-01-22 PROCEDURE — 92250 FUNDUS PHOTOGRAPHY W/I&R: CPT | Performed by: OPHTHALMOLOGY

## 2025-01-22 ASSESSMENT — TONOMETRY
OS_IOP_MMHG: 16
OD_IOP_MMHG: 16

## 2025-01-22 ASSESSMENT — LID EXAM ASSESSMENTS
OS_EDEMA: LLL LUL
OD_ECCHYMOSIS: RUL
OD_EDEMA: RLL RUL
OS_ECCHYMOSIS: LUL

## 2025-01-22 ASSESSMENT — REFRACTION_CURRENTRX
OD_VPRISM_DIRECTION: BF
OS_SPHERE: +2.50
OD_OVR_VA: 20/
OS_ADD: +2.25
OS_SPHERE: +3.00
OS_OVR_VA: 20/
OS_OVR_VA: 20/
OS_VPRISM_DIRECTION: BF
OD_OVR_VA: 20/
OD_SPHERE: +3.00
OS_CYLINDER: -0.50
OS_CYLINDER: -0.50
OS_VPRISM_DIRECTION: BF
OD_ADD: +2.25
OS_VPRISM_DIRECTION: BF
OD_AXIS: 102
OS_VPRISM_DIRECTION: BF
OD_VPRISM_DIRECTION: BF
OD_AXIS: 108
OD_VPRISM_DIRECTION: BF
OD_ADD: +2.25
OD_CYLINDER: -1.25
OD_AXIS: 096
OS_AXIS: 071
OS_AXIS: 078
OD_VPRISM_DIRECTION: BF
OS_OVR_VA: 20/
OD_SPHERE: +3.25
OD_AXIS: 096
OD_ADD: +2.25
OD_OVR_VA: 20/
OS_AXIS: 082
OS_CYLINDER: -0.50
OS_SPHERE: +3.00
OD_SPHERE: +2.75
OS_ADD: +2.25
OS_ADD: +2.25
OD_ADD: +2.25
OD_CYLINDER: -1.00
OS_SPHERE: +2.50
OD_CYLINDER: -0.75
OS_CYLINDER: -0.50
OS_AXIS: 062
OD_SPHERE: +3.25
OD_CYLINDER: -1.25
OS_ADD: +2.25

## 2025-01-22 ASSESSMENT — KERATOMETRY
OD_AXISANGLE_DEGREES: 026
OS_K1POWER_DIOPTERS: 42.50
OS_K2POWER_DIOPTERS: 43.25
OD_K1POWER_DIOPTERS: 43.00
OD_K2POWER_DIOPTERS: 43.50
METHOD_AUTO_MANUAL: AUTO
OS_AXISANGLE_DEGREES: 167

## 2025-01-22 ASSESSMENT — REFRACTION_MANIFEST
OD_SPHERE: +3.50
OD_CYLINDER: -1.00
OS_AXIS: 083
OD_AXIS: 117
OS_CYLINDER: -1.00
OS_SPHERE: +3.25
OD_VA1: 20/30
OS_VA1: 20/20

## 2025-01-22 ASSESSMENT — CONFRONTATIONAL VISUAL FIELD TEST (CVF)
OS_FINDINGS: FULL
OD_FINDINGS: FULL

## 2025-01-22 ASSESSMENT — VISUAL ACUITY
OD_BCVA: 20/30-1
OS_BCVA: 20/40

## 2025-01-22 ASSESSMENT — REFRACTION_AUTOREFRACTION
OD_CYLINDER: -1.25
OD_SPHERE: +3.00
OS_SPHERE: +3.00
OS_AXIS: 079
OD_AXIS: 090
OS_CYLINDER: -0.50

## 2025-02-14 ENCOUNTER — OFFICE (OUTPATIENT)
Dept: URBAN - METROPOLITAN AREA CLINIC 94 | Facility: CLINIC | Age: 70
Setting detail: OPHTHALMOLOGY
End: 2025-02-14
Payer: COMMERCIAL

## 2025-02-14 DIAGNOSIS — H04.553: ICD-10-CM

## 2025-02-14 DIAGNOSIS — H04.123: ICD-10-CM

## 2025-02-14 PROBLEM — H04.552 NASOLACRIMAL DUCT OBSTRUCTION ACQUIRED; RIGHT EYE, LEFT EYE, BOTH EYES: Status: ACTIVE | Noted: 2025-02-14

## 2025-02-14 PROBLEM — H04.551 NASOLACRIMAL DUCT OBSTRUCTION ACQUIRED; RIGHT EYE, LEFT EYE, BOTH EYES: Status: ACTIVE | Noted: 2025-02-14

## 2025-02-14 PROCEDURE — 68810 PROBE NASOLACRIMAL DUCT: CPT | Mod: 50 | Performed by: OPHTHALMOLOGY

## 2025-02-14 PROCEDURE — 92012 INTRM OPH EXAM EST PATIENT: CPT | Mod: 25 | Performed by: OPHTHALMOLOGY

## 2025-02-14 ASSESSMENT — LID EXAM ASSESSMENTS
OS_EDEMA: LLL LUL
OD_EDEMA: RLL RUL
OD_ECCHYMOSIS: RUL
OS_ECCHYMOSIS: LUL

## 2025-02-14 ASSESSMENT — VISUAL ACUITY
OS_BCVA: 20/20-1
OD_BCVA: 20/30-1

## 2025-02-14 ASSESSMENT — REFRACTION_CURRENTRX
OS_CYLINDER: -0.50
OD_OVR_VA: 20/
OD_VPRISM_DIRECTION: BF
OS_SPHERE: +2.50
OD_VPRISM_DIRECTION: BF
OS_AXIS: 082
OS_VPRISM_DIRECTION: BF
OD_CYLINDER: -1.00
OS_VPRISM_DIRECTION: BF
OS_ADD: +2.25
OD_SPHERE: +3.25
OD_AXIS: 108
OS_CYLINDER: -0.50
OS_ADD: +2.25
OD_ADD: +2.25
OD_ADD: +2.25
OD_VPRISM_DIRECTION: BF
OD_AXIS: 096
OS_SPHERE: +3.00
OD_CYLINDER: -1.25
OS_VPRISM_DIRECTION: BF
OS_CYLINDER: -0.50
OD_CYLINDER: -0.75
OS_AXIS: 071
OS_AXIS: 078
OD_AXIS: 096
OS_AXIS: 062
OS_ADD: +2.25
OS_OVR_VA: 20/
OD_OVR_VA: 20/
OS_VPRISM_DIRECTION: BF
OD_AXIS: 102
OD_CYLINDER: -1.25
OD_SPHERE: +3.00
OS_SPHERE: +3.00
OS_SPHERE: +2.50
OD_SPHERE: +3.25
OD_SPHERE: +2.75
OS_OVR_VA: 20/
OD_VPRISM_DIRECTION: BF
OS_ADD: +2.25
OD_OVR_VA: 20/
OS_CYLINDER: -0.50
OD_ADD: +2.25
OS_OVR_VA: 20/
OD_ADD: +2.25

## 2025-02-14 ASSESSMENT — REFRACTION_MANIFEST
OS_AXIS: 083
OD_AXIS: 117
OS_VA1: 20/20
OS_SPHERE: +3.25
OS_CYLINDER: -1.00
OD_VA1: 20/30
OD_SPHERE: +3.50
OD_CYLINDER: -1.00

## 2025-02-14 ASSESSMENT — KERATOMETRY
OS_AXISANGLE_DEGREES: 167
OD_AXISANGLE_DEGREES: 026
OS_K1POWER_DIOPTERS: 42.50
OS_K2POWER_DIOPTERS: 43.25
METHOD_AUTO_MANUAL: AUTO
OD_K1POWER_DIOPTERS: 43.00
OD_K2POWER_DIOPTERS: 43.50

## 2025-02-14 ASSESSMENT — REFRACTION_AUTOREFRACTION
OD_SPHERE: +3.00
OS_CYLINDER: -0.50
OS_AXIS: 079
OD_AXIS: 090
OS_SPHERE: +3.00
OD_CYLINDER: -1.25

## 2025-02-14 ASSESSMENT — TONOMETRY: OS_IOP_MMHG: 20

## 2025-02-14 ASSESSMENT — CONFRONTATIONAL VISUAL FIELD TEST (CVF)
OD_FINDINGS: FULL
OS_FINDINGS: FULL

## 2025-03-14 ENCOUNTER — OFFICE (OUTPATIENT)
Dept: URBAN - METROPOLITAN AREA CLINIC 94 | Facility: CLINIC | Age: 70
Setting detail: OPHTHALMOLOGY
End: 2025-03-14
Payer: COMMERCIAL

## 2025-03-14 DIAGNOSIS — H04.553: ICD-10-CM

## 2025-03-14 DIAGNOSIS — H04.123: ICD-10-CM

## 2025-03-14 PROBLEM — H04.552 NASOLACRIMAL DUCT OBSTRUCTION ACQUIRED; RIGHT EYE, LEFT EYE, BOTH EYES: Status: ACTIVE | Noted: 2025-03-14

## 2025-03-14 PROBLEM — H04.551 NASOLACRIMAL DUCT OBSTRUCTION ACQUIRED; RIGHT EYE, LEFT EYE, BOTH EYES: Status: ACTIVE | Noted: 2025-03-14

## 2025-03-14 PROCEDURE — 92014 COMPRE OPH EXAM EST PT 1/>: CPT | Mod: 25 | Performed by: OPHTHALMOLOGY

## 2025-03-14 PROCEDURE — 68810 PROBE NASOLACRIMAL DUCT: CPT | Mod: 50 | Performed by: OPHTHALMOLOGY

## 2025-03-14 ASSESSMENT — REFRACTION_CURRENTRX
OS_SPHERE: +2.50
OS_ADD: +2.25
OD_SPHERE: +3.25
OS_CYLINDER: -0.50
OD_SPHERE: +2.75
OS_VPRISM_DIRECTION: BF
OS_VPRISM_DIRECTION: BF
OD_ADD: +2.25
OS_OVR_VA: 20/
OS_ADD: +2.25
OD_CYLINDER: -1.25
OD_VPRISM_DIRECTION: BF
OD_CYLINDER: -0.75
OS_VPRISM_DIRECTION: BF
OS_CYLINDER: -0.50
OD_SPHERE: +3.25
OD_SPHERE: +3.00
OS_OVR_VA: 20/
OS_SPHERE: +3.00
OS_AXIS: 078
OD_CYLINDER: -1.00
OD_OVR_VA: 20/
OD_AXIS: 102
OS_AXIS: 082
OS_ADD: +2.25
OS_AXIS: 062
OS_SPHERE: +3.00
OS_VPRISM_DIRECTION: BF
OD_AXIS: 096
OS_OVR_VA: 20/
OS_AXIS: 071
OD_OVR_VA: 20/
OS_ADD: +2.25
OD_ADD: +2.25
OS_SPHERE: +2.50
OD_CYLINDER: -1.25
OS_CYLINDER: -0.50
OD_AXIS: 096
OD_ADD: +2.25
OD_AXIS: 108
OD_VPRISM_DIRECTION: BF
OD_ADD: +2.25
OS_CYLINDER: -0.50
OD_OVR_VA: 20/

## 2025-03-14 ASSESSMENT — REFRACTION_AUTOREFRACTION
OD_CYLINDER: -1.25
OD_SPHERE: +3.00
OS_AXIS: 079
OS_CYLINDER: -0.50
OS_SPHERE: +3.00
OD_AXIS: 090

## 2025-03-14 ASSESSMENT — TONOMETRY
OD_IOP_MMHG: 19
OS_IOP_MMHG: 21

## 2025-03-14 ASSESSMENT — VISUAL ACUITY
OD_BCVA: 20/25-1
OS_BCVA: 20/25-1

## 2025-03-14 ASSESSMENT — REFRACTION_MANIFEST
OD_VA1: 20/30
OS_SPHERE: +3.25
OS_CYLINDER: -1.00
OD_CYLINDER: -1.00
OS_AXIS: 083
OD_SPHERE: +3.50
OD_AXIS: 117
OS_VA1: 20/20

## 2025-03-14 ASSESSMENT — CONFRONTATIONAL VISUAL FIELD TEST (CVF)
OD_FINDINGS: FULL
OS_FINDINGS: FULL

## 2025-03-14 ASSESSMENT — KERATOMETRY
OS_K2POWER_DIOPTERS: 43.25
METHOD_AUTO_MANUAL: AUTO
OD_AXISANGLE_DEGREES: 026
OD_K1POWER_DIOPTERS: 43.00
OD_K2POWER_DIOPTERS: 43.50
OS_AXISANGLE_DEGREES: 167
OS_K1POWER_DIOPTERS: 42.50

## 2025-03-14 ASSESSMENT — LID EXAM ASSESSMENTS
OS_EDEMA: LLL LUL
OD_EDEMA: RLL RUL
OD_ECCHYMOSIS: RUL
OS_ECCHYMOSIS: LUL

## 2025-05-09 ENCOUNTER — OFFICE (OUTPATIENT)
Dept: URBAN - METROPOLITAN AREA CLINIC 94 | Facility: CLINIC | Age: 70
Setting detail: OPHTHALMOLOGY
End: 2025-05-09
Payer: COMMERCIAL

## 2025-05-09 DIAGNOSIS — H04.551: ICD-10-CM

## 2025-05-09 DIAGNOSIS — H04.553: ICD-10-CM

## 2025-05-09 DIAGNOSIS — H04.123: ICD-10-CM

## 2025-05-09 DIAGNOSIS — H04.552: ICD-10-CM

## 2025-05-09 PROCEDURE — 99212 OFFICE O/P EST SF 10 MIN: CPT | Performed by: OPHTHALMOLOGY

## 2025-05-09 ASSESSMENT — LID EXAM ASSESSMENTS
OD_ECCHYMOSIS: RUL
OS_ECCHYMOSIS: LUL
OS_EDEMA: LLL LUL
OD_EDEMA: RLL RUL

## 2025-05-09 ASSESSMENT — KERATOMETRY
OS_K2POWER_DIOPTERS: 43.25
OD_AXISANGLE_DEGREES: 026
OD_K2POWER_DIOPTERS: 43.50
OS_AXISANGLE_DEGREES: 167
METHOD_AUTO_MANUAL: AUTO
OD_K1POWER_DIOPTERS: 43.00
OS_K1POWER_DIOPTERS: 42.50

## 2025-05-09 ASSESSMENT — REFRACTION_CURRENTRX
OD_ADD: +2.25
OD_VPRISM_DIRECTION: BF
OS_ADD: +2.25
OD_AXIS: 102
OD_CYLINDER: -1.00
OD_OVR_VA: 20/
OD_AXIS: 108
OD_CYLINDER: -0.75
OD_SPHERE: +3.25
OS_SPHERE: +2.50
OS_AXIS: 062
OD_ADD: +2.25
OS_SPHERE: +2.50
OS_VPRISM_DIRECTION: BF
OS_OVR_VA: 20/
OS_CYLINDER: -0.50
OS_AXIS: 071
OD_SPHERE: +3.00
OS_CYLINDER: -0.50
OS_AXIS: 078
OS_ADD: +2.25
OS_CYLINDER: -0.50
OD_OVR_VA: 20/
OS_CYLINDER: -0.50
OS_ADD: +2.25
OD_ADD: +2.25
OS_AXIS: 082
OD_CYLINDER: -1.25
OD_VPRISM_DIRECTION: BF
OS_VPRISM_DIRECTION: BF
OS_SPHERE: +3.00
OD_OVR_VA: 20/
OD_VPRISM_DIRECTION: BF
OD_ADD: +2.25
OD_AXIS: 096
OS_SPHERE: +3.00
OS_OVR_VA: 20/
OS_VPRISM_DIRECTION: BF
OD_SPHERE: +2.75
OD_CYLINDER: -1.25
OS_ADD: +2.25
OS_OVR_VA: 20/
OD_SPHERE: +3.25
OD_VPRISM_DIRECTION: BF
OD_AXIS: 096
OS_VPRISM_DIRECTION: BF

## 2025-05-09 ASSESSMENT — REFRACTION_AUTOREFRACTION
OD_AXIS: 090
OS_CYLINDER: -0.50
OS_AXIS: 079
OS_SPHERE: +3.00
OD_SPHERE: +3.00
OD_CYLINDER: -1.25

## 2025-05-09 ASSESSMENT — REFRACTION_MANIFEST
OS_AXIS: 083
OD_VA1: 20/30
OS_SPHERE: +3.25
OS_CYLINDER: -1.00
OD_CYLINDER: -1.00
OD_SPHERE: +3.50
OD_AXIS: 117
OS_VA1: 20/20

## 2025-05-09 ASSESSMENT — VISUAL ACUITY
OD_BCVA: 20/25
OS_BCVA: 20/25

## 2025-05-09 ASSESSMENT — CONFRONTATIONAL VISUAL FIELD TEST (CVF)
OS_FINDINGS: FULL
OD_FINDINGS: FULL

## 2025-05-09 ASSESSMENT — TONOMETRY
OS_IOP_MMHG: 20
OD_IOP_MMHG: 18

## 2025-05-31 ENCOUNTER — OFFICE (OUTPATIENT)
Dept: URBAN - METROPOLITAN AREA CLINIC 94 | Facility: CLINIC | Age: 70
Setting detail: OPHTHALMOLOGY
End: 2025-05-31
Payer: COMMERCIAL

## 2025-05-31 DIAGNOSIS — H25.13: ICD-10-CM

## 2025-05-31 DIAGNOSIS — H04.123: ICD-10-CM

## 2025-05-31 PROCEDURE — 99213 OFFICE O/P EST LOW 20 MIN: CPT | Performed by: OPTOMETRIST

## 2025-05-31 ASSESSMENT — REFRACTION_CURRENTRX
OD_SPHERE: +3.25
OS_OVR_VA: 20/
OD_CYLINDER: -1.25
OD_ADD: +2.25
OS_CYLINDER: -0.50
OS_AXIS: 087
OS_CYLINDER: -0.50
OS_SPHERE: +2.50
OS_SPHERE: +3.00
OD_AXIS: 108
OS_VPRISM_DIRECTION: BF
OS_AXIS: 082
OD_SPHERE: +3.25
OD_SPHERE: +3.25
OS_CYLINDER: -0.75
OS_SPHERE: +2.50
OS_ADD: +2.25
OS_VPRISM_DIRECTION: BF
OS_SPHERE: +3.00
OD_VPRISM_DIRECTION: BF
OD_VPRISM_DIRECTION: BF
OD_AXIS: 102
OD_CYLINDER: -0.75
OS_AXIS: 071
OS_ADD: +2.25
OD_CYLINDER: -1.00
OD_SPHERE: +2.75
OS_VPRISM_DIRECTION: BF
OD_SPHERE: +3.00
OS_OVR_VA: 20/
OS_OVR_VA: 20/
OD_AXIS: 105
OS_CYLINDER: -0.50
OS_CYLINDER: -0.50
OD_ADD: +2.25
OD_OVR_VA: 20/
OS_ADD: +2.25
OS_SPHERE: +3.25
OD_ADD: +2.25
OD_CYLINDER: -1.25
OD_OVR_VA: 20/
OS_AXIS: 078
OD_OVR_VA: 20/
OD_AXIS: 096
OS_AXIS: 062
OS_ADD: +2.25
OS_VPRISM_DIRECTION: BF
OD_ADD: +2.25
OD_CYLINDER: -1.25
OS_ADD: +2.25
OD_VPRISM_DIRECTION: BF
OD_ADD: +2.25
OD_VPRISM_DIRECTION: BF
OD_AXIS: 096

## 2025-05-31 ASSESSMENT — REFRACTION_MANIFEST
OD_CYLINDER: -0.50
OD_AXIS: 095
OD_VA1: 20/30
OD_VA1: 20/25
OS_SPHERE: +3.00
OS_AXIS: 083
OD_SPHERE: +3.50
OS_CYLINDER: -1.00
OS_VA1: 20/20
OS_VA1: 20/25
OD_AXIS: 117
OS_CYLINDER: -0.50
OD_SPHERE: +3.00
OS_SPHERE: +3.25
OS_AXIS: 090
OS_ADD: +2.50
OD_ADD: +2.50
OD_CYLINDER: -1.00

## 2025-05-31 ASSESSMENT — VISUAL ACUITY
OS_BCVA: 20/25
OD_BCVA: 20/25-1

## 2025-05-31 ASSESSMENT — CONFRONTATIONAL VISUAL FIELD TEST (CVF)
OS_FINDINGS: FULL
OD_FINDINGS: FULL

## 2025-06-27 ENCOUNTER — OFFICE (OUTPATIENT)
Dept: URBAN - METROPOLITAN AREA CLINIC 94 | Facility: CLINIC | Age: 70
Setting detail: OPHTHALMOLOGY
End: 2025-06-27
Payer: COMMERCIAL

## 2025-06-27 DIAGNOSIS — H04.552: ICD-10-CM

## 2025-06-27 DIAGNOSIS — H04.553: ICD-10-CM

## 2025-06-27 DIAGNOSIS — H04.551: ICD-10-CM

## 2025-06-27 DIAGNOSIS — H04.123: ICD-10-CM

## 2025-06-27 PROCEDURE — 92012 INTRM OPH EXAM EST PATIENT: CPT | Performed by: OPHTHALMOLOGY

## 2025-06-27 ASSESSMENT — REFRACTION_CURRENTRX
OD_SPHERE: +3.00
OD_VPRISM_DIRECTION: BF
OS_VPRISM_DIRECTION: BF
OD_AXIS: 108
OD_CYLINDER: -1.25
OS_OVR_VA: 20/
OD_SPHERE: +2.75
OS_OVR_VA: 20/
OD_OVR_VA: 20/
OS_ADD: +2.25
OD_VPRISM_DIRECTION: BF
OD_SPHERE: +3.25
OD_AXIS: 102
OD_OVR_VA: 20/
OD_AXIS: 105
OS_CYLINDER: -0.50
OS_CYLINDER: -0.50
OS_ADD: +2.25
OS_ADD: +2.25
OD_ADD: +2.25
OD_CYLINDER: -1.25
OD_ADD: +2.25
OS_SPHERE: +2.50
OS_AXIS: 087
OD_ADD: +2.25
OS_VPRISM_DIRECTION: BF
OD_ADD: +2.25
OS_OVR_VA: 20/
OS_SPHERE: +2.50
OD_OVR_VA: 20/
OD_CYLINDER: -1.00
OD_ADD: +2.25
OD_CYLINDER: -0.75
OD_SPHERE: +3.25
OS_SPHERE: +3.00
OS_VPRISM_DIRECTION: BF
OS_ADD: +2.25
OD_VPRISM_DIRECTION: BF
OD_AXIS: 096
OS_SPHERE: +3.00
OS_AXIS: 071
OS_CYLINDER: -0.50
OS_SPHERE: +3.25
OS_AXIS: 078
OS_CYLINDER: -0.75
OS_AXIS: 062
OD_CYLINDER: -1.25
OS_CYLINDER: -0.50
OD_VPRISM_DIRECTION: BF
OS_VPRISM_DIRECTION: BF
OD_AXIS: 096
OD_SPHERE: +3.25
OS_AXIS: 082
OS_ADD: +2.25

## 2025-06-27 ASSESSMENT — REFRACTION_MANIFEST
OD_VA1: 20/25
OS_AXIS: 083
OS_SPHERE: +3.25
OD_VA1: 20/30
OS_SPHERE: +3.00
OS_VA1: 20/20
OS_CYLINDER: -0.50
OS_VA1: 20/25
OD_SPHERE: +3.00
OD_CYLINDER: -1.00
OS_AXIS: 090
OS_CYLINDER: -1.00
OD_AXIS: 117
OD_CYLINDER: -0.50
OD_ADD: +2.50
OD_AXIS: 095
OD_SPHERE: +3.50
OS_ADD: +2.50

## 2025-06-27 ASSESSMENT — VISUAL ACUITY
OD_BCVA: 20/25
OS_BCVA: 20/25

## 2025-06-27 ASSESSMENT — LID EXAM ASSESSMENTS
OS_EDEMA: LLL LUL
OD_EDEMA: RLL RUL
OS_ECCHYMOSIS: LUL
OD_ECCHYMOSIS: RUL

## 2025-06-27 ASSESSMENT — TONOMETRY
OS_IOP_MMHG: 18
OD_IOP_MMHG: 19

## 2025-06-27 ASSESSMENT — KERATOMETRY
OS_AXISANGLE_DEGREES: 155
OS_K2POWER_DIOPTERS: 43.00
OD_K2POWER_DIOPTERS: 43.75
OD_AXISANGLE_DEGREES: 010
OD_K1POWER_DIOPTERS: 43.00
METHOD_AUTO_MANUAL: AUTO
OS_K1POWER_DIOPTERS: 42.75

## 2025-06-27 ASSESSMENT — REFRACTION_AUTOREFRACTION
OS_SPHERE: +3.50
OS_AXIS: 085
OD_AXIS: 090
OS_CYLINDER: -1.00
OD_SPHERE: +3.50
OD_CYLINDER: -1.00

## 2025-06-27 ASSESSMENT — CONFRONTATIONAL VISUAL FIELD TEST (CVF)
OD_FINDINGS: FULL
OS_FINDINGS: FULL

## 2025-07-01 ENCOUNTER — ASC (OUTPATIENT)
Dept: URBAN - METROPOLITAN AREA SURGERY 8 | Facility: SURGERY | Age: 70
Setting detail: OPHTHALMOLOGY
End: 2025-07-01
Payer: COMMERCIAL

## 2025-07-01 DIAGNOSIS — H40.051: ICD-10-CM

## 2025-07-01 PROCEDURE — 66761 REVISION OF IRIS: CPT | Mod: RT | Performed by: OPHTHALMOLOGY

## 2025-07-01 ASSESSMENT — REFRACTION_CURRENTRX
OD_VPRISM_DIRECTION: BF
OS_VPRISM_DIRECTION: BF
OS_SPHERE: +3.00
OD_AXIS: 105
OD_ADD: +2.25
OS_ADD: +2.25
OD_CYLINDER: -1.25
OS_AXIS: 078
OS_ADD: +2.25
OD_ADD: +2.25
OD_AXIS: 102
OD_OVR_VA: 20/
OS_SPHERE: +2.50
OD_AXIS: 096
OS_CYLINDER: -0.50
OS_VPRISM_DIRECTION: BF
OD_AXIS: 108
OS_OVR_VA: 20/
OS_ADD: +2.25
OS_ADD: +2.25
OD_ADD: +2.25
OD_SPHERE: +3.25
OS_ADD: +2.25
OS_AXIS: 062
OD_CYLINDER: -1.25
OD_VPRISM_DIRECTION: BF
OS_OVR_VA: 20/
OS_SPHERE: +3.25
OS_CYLINDER: -0.50
OS_CYLINDER: -0.75
OS_AXIS: 082
OD_OVR_VA: 20/
OD_AXIS: 096
OS_CYLINDER: -0.50
OS_SPHERE: +2.50
OD_ADD: +2.25
OD_CYLINDER: -1.00
OS_VPRISM_DIRECTION: BF
OD_SPHERE: +3.25
OS_OVR_VA: 20/
OS_AXIS: 087
OD_ADD: +2.25
OS_VPRISM_DIRECTION: BF
OS_SPHERE: +3.00
OD_CYLINDER: -0.75
OD_SPHERE: +2.75
OD_SPHERE: +3.00
OD_SPHERE: +3.25
OD_OVR_VA: 20/
OS_AXIS: 071
OS_CYLINDER: -0.50
OD_CYLINDER: -1.25
OD_VPRISM_DIRECTION: BF
OD_VPRISM_DIRECTION: BF

## 2025-07-01 ASSESSMENT — LID EXAM ASSESSMENTS
OD_EDEMA: RLL RUL
OS_ECCHYMOSIS: LUL
OS_EDEMA: LLL LUL
OD_ECCHYMOSIS: RUL

## 2025-07-01 ASSESSMENT — KERATOMETRY
OD_K1POWER_DIOPTERS: 43.00
OD_AXISANGLE_DEGREES: 010
OS_K1POWER_DIOPTERS: 42.75
OS_AXISANGLE_DEGREES: 155
OS_K2POWER_DIOPTERS: 43.00
METHOD_AUTO_MANUAL: AUTO
OD_K2POWER_DIOPTERS: 43.75

## 2025-07-01 ASSESSMENT — VISUAL ACUITY
OS_BCVA: 20/25
OD_BCVA: 20/25

## 2025-07-01 ASSESSMENT — REFRACTION_MANIFEST
OS_VA1: 20/25
OD_AXIS: 095
OD_AXIS: 117
OS_AXIS: 090
OS_SPHERE: +3.25
OD_VA1: 20/30
OS_ADD: +2.50
OS_VA1: 20/20
OD_CYLINDER: -1.00
OS_CYLINDER: -0.50
OS_AXIS: 083
OS_CYLINDER: -1.00
OD_SPHERE: +3.50
OS_SPHERE: +3.00
OD_ADD: +2.50
OD_CYLINDER: -0.50
OD_VA1: 20/25
OD_SPHERE: +3.00

## 2025-07-01 ASSESSMENT — REFRACTION_AUTOREFRACTION
OD_SPHERE: +3.50
OS_SPHERE: +3.50
OD_CYLINDER: -1.00
OD_AXIS: 090
OS_CYLINDER: -1.00
OS_AXIS: 085

## 2025-07-01 ASSESSMENT — CONFRONTATIONAL VISUAL FIELD TEST (CVF)
OS_FINDINGS: FULL
OD_FINDINGS: FULL

## 2025-07-02 ENCOUNTER — OFFICE (OUTPATIENT)
Dept: URBAN - METROPOLITAN AREA CLINIC 94 | Facility: CLINIC | Age: 70
Setting detail: OPHTHALMOLOGY
End: 2025-07-02
Payer: COMMERCIAL

## 2025-07-02 DIAGNOSIS — H40.052: ICD-10-CM

## 2025-07-02 PROBLEM — H40.051 OCULAR HYPERTENSION; RIGHT EYE, LEFT EYE: Status: ACTIVE | Noted: 2025-07-01

## 2025-07-02 PROCEDURE — 66761 REVISION OF IRIS: CPT | Mod: 79,LT | Performed by: OPHTHALMOLOGY

## 2025-07-02 ASSESSMENT — KERATOMETRY
OS_AXISANGLE_DEGREES: 155
METHOD_AUTO_MANUAL: AUTO
OD_K1POWER_DIOPTERS: 43.00
OD_AXISANGLE_DEGREES: 010
OS_K2POWER_DIOPTERS: 43.00
OS_K1POWER_DIOPTERS: 42.75
OD_K2POWER_DIOPTERS: 43.75

## 2025-07-02 ASSESSMENT — REFRACTION_CURRENTRX
OD_ADD: +2.25
OD_CYLINDER: -1.25
OS_AXIS: 078
OS_AXIS: 071
OD_OVR_VA: 20/
OD_VPRISM_DIRECTION: BF
OD_ADD: +2.25
OS_ADD: +2.25
OS_VPRISM_DIRECTION: BF
OD_CYLINDER: -0.75
OD_AXIS: 102
OS_SPHERE: +3.25
OD_CYLINDER: -1.25
OD_OVR_VA: 20/
OD_VPRISM_DIRECTION: BF
OD_AXIS: 108
OS_OVR_VA: 20/
OD_ADD: +2.25
OD_ADD: +2.25
OD_SPHERE: +3.25
OS_CYLINDER: -0.50
OS_ADD: +2.25
OD_OVR_VA: 20/
OS_SPHERE: +2.50
OD_AXIS: 105
OD_CYLINDER: -1.25
OS_CYLINDER: -0.50
OS_OVR_VA: 20/
OS_SPHERE: +3.00
OD_SPHERE: +2.75
OD_AXIS: 096
OD_SPHERE: +3.25
OS_VPRISM_DIRECTION: BF
OD_SPHERE: +3.00
OS_OVR_VA: 20/
OS_ADD: +2.25
OS_VPRISM_DIRECTION: BF
OS_SPHERE: +3.00
OD_SPHERE: +3.25
OS_AXIS: 082
OS_AXIS: 087
OS_ADD: +2.25
OD_CYLINDER: -1.00
OS_CYLINDER: -0.75
OD_VPRISM_DIRECTION: BF
OD_VPRISM_DIRECTION: BF
OS_VPRISM_DIRECTION: BF
OS_CYLINDER: -0.50
OS_SPHERE: +2.50
OS_AXIS: 062
OS_CYLINDER: -0.50
OS_ADD: +2.25
OD_ADD: +2.25
OD_AXIS: 096

## 2025-07-02 ASSESSMENT — REFRACTION_MANIFEST
OS_CYLINDER: -0.50
OS_SPHERE: +3.25
OD_ADD: +2.50
OD_AXIS: 095
OD_VA1: 20/25
OD_SPHERE: +3.00
OD_AXIS: 117
OS_AXIS: 090
OS_ADD: +2.50
OS_CYLINDER: -1.00
OD_VA1: 20/30
OS_VA1: 20/20
OS_VA1: 20/25
OD_SPHERE: +3.50
OD_CYLINDER: -0.50
OD_CYLINDER: -1.00
OS_AXIS: 083
OS_SPHERE: +3.00

## 2025-07-02 ASSESSMENT — REFRACTION_AUTOREFRACTION
OD_SPHERE: +3.50
OS_SPHERE: +3.50
OS_AXIS: 085
OD_AXIS: 090
OS_CYLINDER: -1.00
OD_CYLINDER: -1.00

## 2025-07-02 ASSESSMENT — VISUAL ACUITY
OD_BCVA: 20/25
OS_BCVA: 20/25